# Patient Record
Sex: MALE | Race: OTHER
[De-identification: names, ages, dates, MRNs, and addresses within clinical notes are randomized per-mention and may not be internally consistent; named-entity substitution may affect disease eponyms.]

---

## 2017-06-30 NOTE — [facility] DISCHARGE SUMMARY E
[facility] Discharge Summary



NAME: CHOCO MONTESINOS

                                      MRN: [id number]

                                AGE: 52Y

ADMITTED: 06/30/2017           DISCHARGED:



FINAL DIAGNOSIS:

Cataract right eye.



HOSPITAL COURSE:

The patient is a 52-year-old gentleman, who underwent uneventful cataract

extraction with intraocular lens implant right eye on 06/30/2017.



DISPOSITION:

The patient was discharged to home.



DISCHARGE INSTRUCTIONS:

He is instructed to resume preoperative medications, take Tylenol as needed

for discomfort, use eye shield, to use Besivance, Durezol, and Ilevro at 3

p.m. and 8 p.m., and to follow up in my office in 1 day.



DICTATING PHYSICIAN: HUI DÍAZ M.D.



[id number]              DT: 06/30/2017 0825

PHY#: [id number]        DD: 06/30/2017 0808

ID:   [id number]               JOB#: [id number]       ACCT: [id number]



cc:HUI DÍAZ M.D.

>

## 2020-03-06 ENCOUNTER — HOSPITAL ENCOUNTER (OUTPATIENT)
Dept: HOSPITAL 62 - OD | Age: 55
End: 2020-03-06
Attending: INTERNAL MEDICINE
Payer: MEDICARE

## 2020-03-06 DIAGNOSIS — R11.10: Primary | ICD-10-CM

## 2020-03-06 PROCEDURE — 74018 RADEX ABDOMEN 1 VIEW: CPT

## 2020-03-06 NOTE — RADIOLOGY REPORT (SQ)
EXAM DESCRIPTION:  KUB



COMPLETED DATE/TIME:  3/6/2020 12:16 pm



REASON FOR STUDY:  VOMITING, UNSPECIFIED R11.10  VOMITING, UNSPECIFIED



COMPARISON:  None.



NUMBER OF VIEWS:  One view.



TECHNIQUE:   Supine radiographic image of the abdomen acquired.



LIMITATIONS:  None.



FINDINGS:  BOWEL GAS PATTERN: Normal bowel gas pattern. No dilated loops.

CALCIFICATIONS: No radiopaque stones overlie kidneys or expected course of ureters.  Scattered pelvic
 phleboliths.

SOFT TISSUES: No gross mass or suggestion of organomegaly.

HARDWARE: Peritoneal dialysis catheter coiled over midline pelvis.  Partially visualize cancellous sc
rews within the left femoral neck.

BONES: No acute fracture. No worrisome bone lesions.

OTHER: No other significant finding.



IMPRESSION:  No evidence of intestinal obstruction or other acute intra-abdominal/pelvic process.



TECHNICAL DOCUMENTATION:  JOB ID:  [id number]

 2011 [employer]- All Rights Reserved



Reading location - IP/workstation name: [email or web address]

## 2020-03-25 ENCOUNTER — HOSPITAL ENCOUNTER (INPATIENT)
Dept: HOSPITAL 62 - 4S | Age: 55
LOS: 3 days | Discharge: HOME | DRG: 391 | End: 2020-03-28
Attending: INTERNAL MEDICINE | Admitting: INTERNAL MEDICINE
Payer: MEDICARE

## 2020-03-25 DIAGNOSIS — Z82.49: ICD-10-CM

## 2020-03-25 DIAGNOSIS — J44.9: ICD-10-CM

## 2020-03-25 DIAGNOSIS — N18.6: ICD-10-CM

## 2020-03-25 DIAGNOSIS — Z83.3: ICD-10-CM

## 2020-03-25 DIAGNOSIS — Z87.891: ICD-10-CM

## 2020-03-25 DIAGNOSIS — I12.0: ICD-10-CM

## 2020-03-25 DIAGNOSIS — R11.2: Primary | ICD-10-CM

## 2020-03-25 DIAGNOSIS — E11.319: ICD-10-CM

## 2020-03-25 DIAGNOSIS — D63.1: ICD-10-CM

## 2020-03-25 DIAGNOSIS — E11.21: ICD-10-CM

## 2020-03-25 DIAGNOSIS — Z83.6: ICD-10-CM

## 2020-03-25 DIAGNOSIS — Z91.19: ICD-10-CM

## 2020-03-25 DIAGNOSIS — Z03.818: ICD-10-CM

## 2020-03-25 DIAGNOSIS — Z79.899: ICD-10-CM

## 2020-03-25 DIAGNOSIS — E83.39: ICD-10-CM

## 2020-03-25 DIAGNOSIS — Z99.2: ICD-10-CM

## 2020-03-25 DIAGNOSIS — N25.81: ICD-10-CM

## 2020-03-25 DIAGNOSIS — E87.5: ICD-10-CM

## 2020-03-25 DIAGNOSIS — F32.9: ICD-10-CM

## 2020-03-25 DIAGNOSIS — E11.40: ICD-10-CM

## 2020-03-25 DIAGNOSIS — E11.22: ICD-10-CM

## 2020-03-25 DIAGNOSIS — E83.51: ICD-10-CM

## 2020-03-25 DIAGNOSIS — E78.5: ICD-10-CM

## 2020-03-25 LAB
ADD MANUAL DIFF: NO
ALBUMIN SERPL-MCNC: 4 G/DL (ref 3.5–5)
ALBUMIN SERPL-MCNC: 4 G/DL (ref 3.5–5)
ALP SERPL-CCNC: 73 U/L (ref 38–126)
ALP SERPL-CCNC: 73 U/L (ref 38–126)
AMYLASE SERPL-CCNC: 144 U/L (ref 30–110)
ANION GAP SERPL CALC-SCNC: 14 MMOL/L (ref 5–19)
ANION GAP SERPL CALC-SCNC: 14 MMOL/L (ref 5–19)
APPEARANCE FLD: CLEAR
APPEARANCE UR: CLEAR
APTT BLD: 27.8 SEC (ref 23.5–35.8)
APTT PPP: YELLOW S
AST SERPL-CCNC: 25 U/L (ref 17–59)
AST SERPL-CCNC: 25 U/L (ref 17–59)
BARBITURATES UR QL SCN: NEGATIVE
BASOPHILS # BLD AUTO: 0 10^3/UL (ref 0–0.2)
BASOPHILS NFR BLD AUTO: 0.3 % (ref 0–2)
BILIRUB DIRECT SERPL-MCNC: 0.4 MG/DL (ref 0–0.4)
BILIRUB DIRECT SERPL-MCNC: 0.4 MG/DL (ref 0–0.4)
BILIRUB SERPL-MCNC: 0.4 MG/DL (ref 0.2–1.3)
BILIRUB SERPL-MCNC: 0.4 MG/DL (ref 0.2–1.3)
BILIRUB UR QL STRIP: NEGATIVE
BODY FLD TYPE: (no result)
BUN SERPL-MCNC: 57 MG/DL (ref 7–20)
BUN SERPL-MCNC: 57 MG/DL (ref 7–20)
CALCIUM: 7.9 MG/DL (ref 8.4–10.2)
CALCIUM: 7.9 MG/DL (ref 8.4–10.2)
CHLORIDE SERPL-SCNC: 99 MMOL/L (ref 98–107)
CHLORIDE SERPL-SCNC: 99 MMOL/L (ref 98–107)
CO2 SERPL-SCNC: 27 MMOL/L (ref 22–30)
CO2 SERPL-SCNC: 27 MMOL/L (ref 22–30)
EOSINOPHIL # BLD AUTO: 0 10^3/UL (ref 0–0.6)
EOSINOPHIL NFR BLD AUTO: 0.1 % (ref 0–6)
ERYTHROCYTE [DISTWIDTH] IN BLOOD BY AUTOMATED COUNT: 17.8 % (ref 11.5–14)
FLUID COLOR: COLORLESS
FLUID SOURCE: (no result)
FLUID VISCOSITY: (no result)
FREE T4 (FREE THYROXINE): 1 NG/DL (ref 0.78–2.19)
GLUCOSE SERPL-MCNC: 115 MG/DL (ref 75–110)
GLUCOSE SERPL-MCNC: 115 MG/DL (ref 75–110)
GLUCOSE UR STRIP-MCNC: >=500 MG/DL
HCT VFR BLD CALC: 35.3 % (ref 37.9–51)
HGB BLD-MCNC: 11.8 G/DL (ref 13.5–17)
INR PPP: 1.07
KETONES UR STRIP-MCNC: NEGATIVE MG/DL
LYMPHOCYTES # BLD AUTO: 0.4 10^3/UL (ref 0.5–4.7)
LYMPHOCYTES NFR BLD AUTO: 6.7 % (ref 13–45)
MCH RBC QN AUTO: 32.6 PG (ref 27–33.4)
MCHC RBC AUTO-ENTMCNC: 33.4 G/DL (ref 32–36)
MCV RBC AUTO: 98 FL (ref 80–97)
METHADONE UR QL SCN: NEGATIVE
MONOCYTES # BLD AUTO: 0.3 10^3/UL (ref 0.1–1.4)
MONOCYTES NFR BLD AUTO: 5.1 % (ref 3–13)
NEUTROPHILS # BLD AUTO: 5.5 10^3/UL (ref 1.7–8.2)
NEUTS SEG NFR BLD AUTO: 87.8 % (ref 42–78)
NITRITE UR QL STRIP: NEGATIVE
PCP UR QL SCN: NEGATIVE
PH UR STRIP: 7 [PH] (ref 5–9)
PHOSPHATE SERPL-MCNC: 6.5 MG/DL (ref 2.5–4.5)
PLATELET # BLD: 197 10^3/UL (ref 150–450)
POTASSIUM SERPL-SCNC: 3.8 MMOL/L (ref 3.6–5)
POTASSIUM SERPL-SCNC: 3.8 MMOL/L (ref 3.6–5)
PROT SERPL-MCNC: 7 G/DL (ref 6.3–8.2)
PROT SERPL-MCNC: 7 G/DL (ref 6.3–8.2)
PROT UR STRIP-MCNC: >=500 MG/DL
PROTHROMBIN TIME: 13.9 SEC (ref 11.4–15.4)
RBC # BLD AUTO: 3.61 10^6/UL (ref 4.35–5.55)
SP GR UR STRIP: 1.01
TOTAL CELLS COUNTED % (AUTO): 100 %
TSH SERPL-ACNC: 2.22 UIU/ML (ref 0.47–4.68)
URINE AMPHETAMINES SCREEN: NEGATIVE
URINE BENZODIAZEPINES SCREEN: NEGATIVE
URINE COCAINE SCREEN: NEGATIVE
URINE MARIJUANA (THC) SCREEN: NEGATIVE
UROBILINOGEN UR-MCNC: NEGATIVE MG/DL (ref ?–2)
WBC # BLD AUTO: 6.2 10^3/UL (ref 4–10.5)

## 2020-03-25 PROCEDURE — 87075 CULTR BACTERIA EXCEPT BLOOD: CPT

## 2020-03-25 PROCEDURE — 89050 BODY FLUID CELL COUNT: CPT

## 2020-03-25 PROCEDURE — 84439 ASSAY OF FREE THYROXINE: CPT

## 2020-03-25 PROCEDURE — 83880 ASSAY OF NATRIURETIC PEPTIDE: CPT

## 2020-03-25 PROCEDURE — 85027 COMPLETE CBC AUTOMATED: CPT

## 2020-03-25 PROCEDURE — 85610 PROTHROMBIN TIME: CPT

## 2020-03-25 PROCEDURE — 82962 GLUCOSE BLOOD TEST: CPT

## 2020-03-25 PROCEDURE — 80061 LIPID PANEL: CPT

## 2020-03-25 PROCEDURE — 71045 X-RAY EXAM CHEST 1 VIEW: CPT

## 2020-03-25 PROCEDURE — 80307 DRUG TEST PRSMV CHEM ANLYZR: CPT

## 2020-03-25 PROCEDURE — 85730 THROMBOPLASTIN TIME PARTIAL: CPT

## 2020-03-25 PROCEDURE — 87635 SARS-COV-2 COVID-19 AMP PRB: CPT

## 2020-03-25 PROCEDURE — 84100 ASSAY OF PHOSPHORUS: CPT

## 2020-03-25 PROCEDURE — 90945 DIALYSIS ONE EVALUATION: CPT

## 2020-03-25 PROCEDURE — 74176 CT ABD & PELVIS W/O CONTRAST: CPT

## 2020-03-25 PROCEDURE — 83735 ASSAY OF MAGNESIUM: CPT

## 2020-03-25 PROCEDURE — 87880 STREP A ASSAY W/OPTIC: CPT

## 2020-03-25 PROCEDURE — 82553 CREATINE MB FRACTION: CPT

## 2020-03-25 PROCEDURE — 80048 BASIC METABOLIC PNL TOTAL CA: CPT

## 2020-03-25 PROCEDURE — 80053 COMPREHEN METABOLIC PANEL: CPT

## 2020-03-25 PROCEDURE — 85025 COMPLETE CBC W/AUTO DIFF WBC: CPT

## 2020-03-25 PROCEDURE — C9113 INJ PANTOPRAZOLE SODIUM, VIA: HCPCS

## 2020-03-25 PROCEDURE — 87804 INFLUENZA ASSAY W/OPTIC: CPT

## 2020-03-25 PROCEDURE — 36415 COLL VENOUS BLD VENIPUNCTURE: CPT

## 2020-03-25 PROCEDURE — 87205 SMEAR GRAM STAIN: CPT

## 2020-03-25 PROCEDURE — 82150 ASSAY OF AMYLASE: CPT

## 2020-03-25 PROCEDURE — 84443 ASSAY THYROID STIM HORMONE: CPT

## 2020-03-25 PROCEDURE — 83690 ASSAY OF LIPASE: CPT

## 2020-03-25 PROCEDURE — 83036 HEMOGLOBIN GLYCOSYLATED A1C: CPT

## 2020-03-25 PROCEDURE — 87070 CULTURE OTHR SPECIMN AEROBIC: CPT

## 2020-03-25 PROCEDURE — 80076 HEPATIC FUNCTION PANEL: CPT

## 2020-03-25 PROCEDURE — 81001 URINALYSIS AUTO W/SCOPE: CPT

## 2020-03-25 PROCEDURE — 82140 ASSAY OF AMMONIA: CPT

## 2020-03-25 PROCEDURE — 87040 BLOOD CULTURE FOR BACTERIA: CPT

## 2020-03-25 PROCEDURE — 82550 ASSAY OF CK (CPK): CPT

## 2020-03-25 RX ADMIN — PANTOPRAZOLE SODIUM SCH MG: 40 INJECTION, POWDER, FOR SOLUTION INTRAVENOUS at 21:40

## 2020-03-25 RX ADMIN — HYDRALAZINE HYDROCHLORIDE SCH MG: 50 TABLET, FILM COATED ORAL at 21:41

## 2020-03-25 RX ADMIN — ATORVASTATIN CALCIUM SCH MG: 40 TABLET, FILM COATED ORAL at 21:41

## 2020-03-25 RX ADMIN — HEPARIN SODIUM SCH: 5000 INJECTION, SOLUTION INTRAVENOUS; SUBCUTANEOUS at 17:17

## 2020-03-25 RX ADMIN — INSULIN LISPRO SCH: 100 INJECTION, SOLUTION INTRAVENOUS; SUBCUTANEOUS at 21:47

## 2020-03-25 RX ADMIN — HEPARIN SODIUM SCH UNIT: 5000 INJECTION, SOLUTION INTRAVENOUS; SUBCUTANEOUS at 21:40

## 2020-03-25 RX ADMIN — DEXAMETHASONE SODIUM PHOSPHATE PRN MG: 10 INJECTION INTRAMUSCULAR; INTRAVENOUS at 20:15

## 2020-03-25 RX ADMIN — CEFTRIAXONE SCH MLS/HR: 1 INJECTION, SOLUTION INTRAVENOUS at 21:42

## 2020-03-25 RX ADMIN — FUROSEMIDE SCH MG: 80 TABLET ORAL at 21:41

## 2020-03-25 NOTE — RADIOLOGY REPORT (SQ)
EXAM DESCRIPTION:  CT ABD/PELVIS NO ORAL OR IV



COMPLETED DATE/TIME:  3/25/2020 4:20 pm



REASON FOR STUDY:  INTRACTABLE VOMITING



COMPARISON:  3/11/2019



TECHNIQUE:  CT scan of the abdomen and pelvis performed without intravenous or oral contrast. Images 
reviewed with lung, soft tissue, and bone windows. Reconstructed coronal and sagittal MPR images revi
ewed. All images stored on PACS.

All CT scanners at this facility use dose modulation, iterative reconstruction, and/or weight based d
osing when appropriate to reduce radiation dose to as low as reasonably achievable (ALARA).

[facility]: Dose Right  [facility]: CareDose    [facility]: Dose Right    [facility]: Teradose 4D    [facility]: Smart Technologies



RADIATION DOSE:  CT Rad equipment meets quality standard of care and radiation dose reduction techniq
ues were employed. CTDIvol: 7.9 mGy. DLP: 416 mGy-cm.mGy.



LIMITATIONS:  None.



FINDINGS:  LOWER CHEST: Hiatal hernia.

NON-CONTRASTED LIVER, SPLEEN, ADRENALS: Evaluation limited by lack of IV contrast. No identified sign
ificant masses.

PANCREAS: No masses. No peripancreatic inflammatory changes.

GALLBLADDER: No identified stones by CT criteria. No inflammatory changes to suggest cholecystitis.

RIGHT KIDNEY AND URETER: No suspicious masses. Assessment limited by lack of IV contrast.   No signif
icant calcifications.   No hydronephrosis or hydroureter.

LEFT KIDNEY AND URETER: No suspicious masses. Assessment limited by lack of IV contrast.   No signifi
cant calcifications.   No hydronephrosis or hydroureter.

AORTA AND RETROPERITONEUM: No aneurysm. No retroperitoneal masses or adenopathy.

BOWEL AND PERITONEAL CAVITY: Percutaneous catheter coiled in the pelvis from a left lower quadrant an
terior approach.

APPENDIX: Not visualized.

PELVIS, BLADDER, AND ABDOMINAL WALL:No abnormal masses. No free fluid. Bladder normal.

BONES: No significant findings.

OTHER: No other significant finding.



IMPRESSION:  No acute findings.



COMMENT:  Quality ID # [id number]: Final reports with documentation of one or more dose reduction techniques
 (e.g., Automated exposure control, adjustment of the mA and/or kV according to patient size, use of 
iterative reconstruction technique)



TECHNICAL DOCUMENTATION:  JOB ID:  [id number]

 2011 [employer]- All Rights Reserved



Reading location - IP/workstation name: [email or web address]

## 2020-03-25 NOTE — PDOC H&P
History of Present Illness


Admission Date/PCP: 


  03/25/20 14:12





  SARBJIT CASTRO MD





History of Present Illness: 


CHOCO MONTESIONS JR is a 55 year old male, He has end-stage renal disease on p

eritoneal dialysis, he came to the office today for evaluation of vomiting for 

the last 2 days, he said he is not able to keep any food down in his stomach he 

has been vomiting potentially in the last 2 days.  I was particularly concerned 

about peritonitis in this patient because he is on PD the abdomen is not 

particularly tender on palpation but that by itself is not sensitive enough to 

diagnose peritonitis in this patient population.  He was admitted directly from 

outpatient into the hospital a CAT scan was obtained it was nondiagnostic there 

was no acute pathology demonstrated on the CAT scan.








Past Medical History


Cardiac Medical History: Reports: Hyperlipidema, Hypertension


Pulmonary Medical History: Reports: Chronic Obstructive Pulmonary Disease (COPD)


Neurological Medical History: 


   Denies: Seizures


Endocrine Medical History: Reports: Diabetes Mellitus Type 2 - He has diabetic 

retinopathy and nephropathy.  There is proteinuria


Renal/ Medical History: Reports: End Stage Renal Disease


Psychiatric Medical History: Reports: Depression


Hematology: Reports: Anemia


   Denies: Sickle Cell Disease





Past Surgical History


Past Surgical History: Reports: Orthopedic Surgery, Other - Left leg for plate 

in his ankle, also has plates in his shoulder and hip.


   Denies: Pacemaker





Social History


Smoking Status: Former Smoker


Last Time Smoked: 1/1/2017


Frequency of Alcohol Use: None


Hx Recreational Drug Use: No


Drugs: None


Hx Prescription Drug Abuse: No





Family History


Family History: CAD, COPD, DM, Hyperlipidemia, Hypertension


Parental Family History Reviewed: Yes


Children Family History Reviewed: Yes


Sibling(s) Family History Reviewed.: Yes





Medication/Allergy


Home Medications: 








Atorvastatin Calcium [Lipitor 40 mg Tablet] 40 mg PO DAILY 03/25/20 


Furosemide [Lasix 80 mg Tablet] 80 mg PO Q12 03/25/20 


Gentamicin Sulfate [Garamycin 0.1% Cream 15 gm] 1 applic TOP ASDIR PRN 03/25/20 


Hydralazine HCl 100 mg PO Q8 03/25/20 








Allergies/Adverse Reactions: 


                                        





No Known Allergies Allergy (Verified 07/30/19 17:02)


   











Review of Systems


Constitutional: ABSENT: chills, fever(s), headache(s), weight gain, weight loss


Eyes: ABSENT: visual disturbances


Ears: ABSENT: hearing changes


Cardiovascular: ABSENT: chest pain, dyspnea on exertion, edema, orthropnea, 

palpitations


Respiratory: ABSENT: cough, hemoptysis


Gastrointestinal: PRESENT: abdominal pain, vomiting


Genitourinary: ABSENT: dysuria, hematuria


Musculoskeletal: ABSENT: joint swelling


Integumentary: ABSENT: rash, wounds


Neurological: ABSENT: abnormal gait, abnormal speech, confusion, dizziness, 

focal weakness, syncope


Psychiatric: ABSENT: anxiety, depression, homidical ideation, suicidal ideation


Endocrine: ABSENT: cold intolerance, heat intolerance, menstrual abnormalities, 

polydipsia, polyuria


Hematologic/Lymphatic: ABSENT: easy bleeding, easy bruising, lymphadenopathy





Physical Exam


Vital Signs: 


                                        











Temp Pulse Resp BP Pulse Ox


 


 98.0 F   96   17   198/76 H  98 


 


 03/25/20 15:42  03/25/20 15:42  03/25/20 15:42  03/25/20 15:45  03/25/20 15:42








                                 Intake & Output











 03/24/20 03/25/20 03/26/20





 06:59 06:59 06:59


 


Intake Total   240


 


Balance   240


 


Weight   76.6 kg











General appearance: PRESENT: no acute distress


Head exam: PRESENT: atraumatic, normocephalic


Eye exam: PRESENT: conjunctiva pink, EOMI, PERRLA


Ear exam: PRESENT: normal external ear exam


Mouth exam: PRESENT: moist, tongue midline


Neck exam: PRESENT: full ROM


Respiratory exam: PRESENT: clear to auscultation nacho


Cardiovascular exam: PRESENT: RRR, +S1, +S2


Pulses: PRESENT: normal dorsalis pedis pul, +2 pedal pulses bilateral


Vascular exam: PRESENT: normal capillary refill


GI/Abdominal exam: PRESENT: normal bowel sounds, soft


Rectal exam: PRESENT: deferred


Neurological exam: PRESENT: alert, awake, oriented to person, oriented to place,

oriented to time, oriented to situation, CN II-XII grossly intact.  ABSENT: 

motor sensory deficit


Psychiatric exam: PRESENT: appropriate affect, normal mood


Skin exam: PRESENT: dry, intact, warm





Results


Laboratory Results: 


                                        





                                 03/25/20 14:52 





                                 03/25/20 14:52 





                                        











  03/25/20 03/25/20 03/25/20





  14:52 14:52 14:52


 


WBC  6.2  


 


RBC  3.61 L  


 


Hgb  11.8 L  


 


Hct  35.3 L  


 


MCV  98 H  


 


MCH  32.6  


 


MCHC  33.4  


 


RDW  17.8 H  


 


Plt Count  197  


 


Seg Neutrophils %  87.8 H  


 


Sodium   140.0  140.0


 


Potassium   3.8  3.8


 


Chloride   99  99


 


Carbon Dioxide   27  27


 


Anion Gap   14  14


 


BUN   57 H  57 H


 


Creatinine   13.27 H  13.27 H


 


Est GFR ( Amer)   5 L  5 L


 


Glucose   115 H  115 H


 


Calcium   7.9 L  7.9 L


 


Phosphorus    6.5 H


 


Magnesium    1.5 L


 


Total Bilirubin   0.4  0.4


 


AST   25  25


 


Alkaline Phosphatase   73  73


 


Ammonia   


 


Total Protein   7.0  7.0


 


Albumin   4.0  4.0


 


Amylase    144 H


 


Lipase    262.4


 


TSH   


 


Free T4   


 


Urine Color   


 


Urine Appearance   


 


Urine pH   


 


Ur Specific Gravity   


 


Urine Protein   


 


Urine Glucose (UA)   


 


Urine Ketones   


 


Urine Blood   


 


Urine Nitrite   


 


Ur Leukocyte Esterase   


 


Urine WBC (Auto)   


 


Urine RBC (Auto)   














  03/25/20 03/25/20 03/25/20





  14:52 17:05 17:26


 


WBC   


 


RBC   


 


Hgb   


 


Hct   


 


MCV   


 


MCH   


 


MCHC   


 


RDW   


 


Plt Count   


 


Seg Neutrophils %   


 


Sodium   


 


Potassium   


 


Chloride   


 


Carbon Dioxide   


 


Anion Gap   


 


BUN   


 


Creatinine   


 


Est GFR (African Amer)   


 


Glucose   


 


Calcium   


 


Phosphorus   


 


Magnesium   


 


Total Bilirubin   


 


AST   


 


Alkaline Phosphatase   


 


Ammonia    < 8.7 L


 


Total Protein   


 


Albumin   


 


Amylase   


 


Lipase   


 


TSH  2.22  


 


Free T4  1.00  


 


Urine Color   YELLOW 


 


Urine Appearance   CLEAR 


 


Urine pH   7.0 


 


Ur Specific Gravity   1.012 


 


Urine Protein   >=500 H 


 


Urine Glucose (UA)   >=500 H 


 


Urine Ketones   NEGATIVE 


 


Urine Blood   NEGATIVE 


 


Urine Nitrite   NEGATIVE 


 


Ur Leukocyte Esterase   MODERATE H 


 


Urine WBC (Auto)   51 


 


Urine RBC (Auto)   3 








                                        











  03/25/20 03/25/20 03/25/20





  14:52 17:26 17:26


 


Creatine Kinase   241 H 


 


CK-MB (CK-2)    3.15


 


NT-Pro-B Natriuret Pep  34241 H  











Impressions: 


                                        





Abdomen/Pelvis CT  03/25/20 00:00


IMPRESSION:  No acute findings.


 














Assessment & Plan





- Diagnosis


(1) Intractable vomiting


Qualifiers: 


   Vomiting type: unspecified   Nausea presence: with nausea   Qualified 

Code(s): R11.2 - Nausea with vomiting, unspecified   


Is this a current diagnosis for this admission?: Yes   


Plan: 


The differential diagnosis very long, The CT scan  did not demonstrate any 

obstruction. patient will be empirically started on Rocephin after blood 

cultures obtained and also peritoneal fluid analysis, suspect peritonitis








(2) End-stage renal disease on peritoneal dialysis


Is this a current diagnosis for this admission?: Yes   


Plan: 


Consult nephrology for management

## 2020-03-26 LAB
CHOLEST SERPL-MCNC: 192.98 MG/DL (ref 0–200)
LDLC SERPL DIRECT ASSAY-MCNC: 102 MG/DL (ref ?–100)
TRIGL SERPL-MCNC: 165 MG/DL (ref ?–150)
VLDLC SERPL CALC-MCNC: 33 MG/DL (ref 10–31)

## 2020-03-26 RX ADMIN — FUROSEMIDE SCH MG: 80 TABLET ORAL at 10:05

## 2020-03-26 RX ADMIN — DEXAMETHASONE SODIUM PHOSPHATE PRN MG: 10 INJECTION INTRAMUSCULAR; INTRAVENOUS at 17:36

## 2020-03-26 RX ADMIN — HYDRALAZINE HYDROCHLORIDE SCH MG: 50 TABLET, FILM COATED ORAL at 21:06

## 2020-03-26 RX ADMIN — HYDROMORPHONE HYDROCHLORIDE PRN MG: 2 INJECTION INTRAMUSCULAR; INTRAVENOUS; SUBCUTANEOUS at 20:18

## 2020-03-26 RX ADMIN — FUROSEMIDE SCH MG: 80 TABLET ORAL at 21:06

## 2020-03-26 RX ADMIN — INSULIN LISPRO SCH: 100 INJECTION, SOLUTION INTRAVENOUS; SUBCUTANEOUS at 17:31

## 2020-03-26 RX ADMIN — DEXAMETHASONE SODIUM PHOSPHATE PRN MG: 10 INJECTION INTRAMUSCULAR; INTRAVENOUS at 05:40

## 2020-03-26 RX ADMIN — HEPARIN SODIUM SCH UNIT: 5000 INJECTION, SOLUTION INTRAVENOUS; SUBCUTANEOUS at 05:40

## 2020-03-26 RX ADMIN — INSULIN LISPRO SCH: 100 INJECTION, SOLUTION INTRAVENOUS; SUBCUTANEOUS at 21:07

## 2020-03-26 RX ADMIN — ATORVASTATIN CALCIUM SCH MG: 40 TABLET, FILM COATED ORAL at 21:06

## 2020-03-26 RX ADMIN — INSULIN LISPRO SCH: 100 INJECTION, SOLUTION INTRAVENOUS; SUBCUTANEOUS at 08:03

## 2020-03-26 RX ADMIN — PANTOPRAZOLE SODIUM SCH MG: 40 INJECTION, POWDER, FOR SOLUTION INTRAVENOUS at 10:05

## 2020-03-26 RX ADMIN — CEFTRIAXONE SCH MLS/HR: 1 INJECTION, SOLUTION INTRAVENOUS at 21:07

## 2020-03-26 RX ADMIN — HYDRALAZINE HYDROCHLORIDE SCH MG: 50 TABLET, FILM COATED ORAL at 14:40

## 2020-03-26 RX ADMIN — INSULIN LISPRO SCH UNIT: 100 INJECTION, SOLUTION INTRAVENOUS; SUBCUTANEOUS at 11:54

## 2020-03-26 RX ADMIN — ACETAMINOPHEN PRN MG: 325 TABLET ORAL at 03:03

## 2020-03-26 RX ADMIN — GENTAMICIN SULFATE SCH APPLIC: 1 CREAM TOPICAL at 10:04

## 2020-03-26 RX ADMIN — ACETAMINOPHEN PRN MG: 325 TABLET ORAL at 17:37

## 2020-03-26 RX ADMIN — HYDRALAZINE HYDROCHLORIDE SCH MG: 50 TABLET, FILM COATED ORAL at 05:39

## 2020-03-26 RX ADMIN — HEPARIN SODIUM SCH: 5000 INJECTION, SOLUTION INTRAVENOUS; SUBCUTANEOUS at 15:10

## 2020-03-26 RX ADMIN — HEPARIN SODIUM SCH: 5000 INJECTION, SOLUTION INTRAVENOUS; SUBCUTANEOUS at 21:07

## 2020-03-26 NOTE — PDOC CONSULTATION
Consultation


Consult Date: 03/25/20


Provider Consulted: YOKO LANDERS


Consult reason:: ESRD on PD with abdominal pains and nausea and vomiting.





History of Present Illness


Admission Date/PCP: 


  03/25/20 14:12





  SARBJIT CASTRO MD





History of Present Illness: 


CHOCO MONTESINOS JR is a 55 year old male with a history of ESRD on peritoneal 

dialysis in the background of diabetes mellitus, hypertension comes in with a 

history of persistent and intractable nausea with vomiting of 1 day's 

duration.He says he has been sipping some amount of fluids but unable to eat 

anything.  He still makes small amounts of urine. There is no history of 

abdominal pains.  He says the effluent when he saw this morning was clear and 

not cloudy. He also gives a history of cough rather drive on the day before 

which apparently seems to have almost resolved as of today.  No history of any 

fever chills shortness of breath.  He continues to get PD at home on a cycler 

and goes dry in the day and starts his daily PD at 6 PM with a free back before 

he gets on the cycler.  His exit site is clean.  Labs and medications were 

reviewed.  Currently he is dry with no fluid in him.














Past Medical History


Cardiac Medical History: Reports: Hyperlipidemia, Hypertension-primary


   Denies: Coronary Artery Disease, Myocardial Infarction


Pulmonary Medical History: Reports: Chronic Obstructive Pulmonary Disease (COPD)


   Denies: Asthma, Bronchitis, Pneumonia


Neurological Medical History: 


   Denies: Seizures


Endocrine Medical History: Reports: Diabetes Mellitus Type 2 - He has diabetic 

retinopathy and nephropathy.  There is proteinuria


Complications of Diabetes: Reports: Autonomic Neuropathy, Nephropathy, 

Retinopathy


Renal/ Medical History: Reports: End Stage Renal Disease, Hypocalcemia, 

Hyperkalemia, Hyperphosphatemia, Secondary Hyperparathyroidism


GI Medical History: 


   Denies: Hepatitis, Hiatal Hernia


Musculoskeltal Medical History: 


   Denies: Arthritis


Psychiatric Medical History: Reports: Depression





Past Surgical History


Past Surgical History: Reports: Orthopedic Surgery, Other - Left leg for plate 

in his ankle, also has plates in his shoulder and hip.


   Denies: Pacemaker





Social History


Smoking Status: Former Smoker


Last Time Smoked: 1/1/2017


Frequency of Alcohol Use: None


Hx Recreational Drug Use: No


Drugs: None


Hx Prescription Drug Abuse: No





Family History


Parental Family History Reviewed: No


Children Family History Reviewed: No


Sibling(s) Family History Reviewed.: No





Medication/Allergy


Home Medications: 








Atorvastatin Calcium [Lipitor 40 mg Tablet] 40 mg PO DAILY 03/25/20 


Furosemide [Lasix 80 mg Tablet] 80 mg PO Q12 03/25/20 


Gentamicin Sulfate [Garamycin 0.1% Cream 15 gm] 1 applic TOP ASDIR PRN 03/25/20 


Hydralazine HCl 100 mg PO Q8 03/25/20 








Allergies/Adverse Reactions: 


                                        





No Known Allergies Allergy (Verified 07/30/19 17:02)


   











Review of Systems


Constitutional: PRESENT: anorexia.  ABSENT: chills, fatigue, fever(s), 

headache(s), night sweats, weakness


Nose, Mouth, and Throat: ABSENT: mouth pain, sore throat


Cardiovascular: ABSENT: chest pain, dyspnea on exertion, edema, orthropnea, 

palpitations


Respiratory: PRESENT: cough - Had a dry cough yesterday but seems to be almost 

resolved as of today..  ABSENT: dyspnea, hemoptysis


Gastrointestinal: PRESENT: nausea, vomiting.  ABSENT: abdominal pain, bloating, 

coffee ground emesis, constipation, diarrhea, dysphagia, heartburn, hematemesis,

hematochezia


Genitourinary: ABSENT: dysuria, hematuria


Musculoskeletal: ABSENT: deformity, joint swelling


Integumentary: ABSENT: erythema, lesions, pruritus, rash


Neurological: ABSENT: abnormal movements, abnormal speech, confusion, 

convulsions, focal weakness, frequent falls


Psychiatric: ABSENT: anxiety


Hematologic/Lymphatic: ABSENT: easy bruising, lymphadenopathy





Physical Exam


Vital Signs: 


                                        











Temp Pulse Resp BP Pulse Ox


 


 98.0 F   96   17   198/76 H  98 


 


 03/25/20 15:42  03/25/20 15:42  03/25/20 15:42  03/25/20 15:45  03/25/20 15:42








                                 Intake & Output











 03/24/20 03/25/20 03/26/20





 06:59 06:59 06:59


 


Intake Total   240


 


Balance   240


 


Weight   76.6 kg











General appearance: PRESENT: no acute distress


Eye exam: PRESENT: EOMI, PERRLA.  ABSENT: scleral icterus


Ear exam: PRESENT: normal external ear exam


Mouth exam: ABSENT: moist


Neck exam: ABSENT: lymphadenopathy, meningismus, tenderness, thyromegaly, 

tracheal deviation


Respiratory exam: PRESENT: clear to auscultation nacho.  ABSENT: crackles


Cardiovascular exam: PRESENT: +S1, +S2


GI/Abdominal exam: PRESENT: normal bowel sounds, soft.  ABSENT: organomegaly, 

tenderness


Extremities exam: ABSENT: pedal edema


Neurological exam: PRESENT: alert, awake, oriented to person, oriented to place,

oriented to time


Psychiatric exam: PRESENT: appropriate affect


Skin exam: ABSENT: cyanosis, jaundice, mottled, rash





Results


Laboratory Results: 


                                        





                                 03/25/20 14:52 





                                 03/25/20 14:52 





                                        











  03/25/20 03/25/20 03/25/20





  14:52 14:52 14:52


 


WBC  6.2  


 


RBC  3.61 L  


 


Hgb  11.8 L  


 


Hct  35.3 L  


 


MCV  98 H  


 


MCH  32.6  


 


MCHC  33.4  


 


RDW  17.8 H  


 


Plt Count  197  


 


Seg Neutrophils %  87.8 H  


 


Sodium   140.0  140.0


 


Potassium   3.8  3.8


 


Chloride   99  99


 


Carbon Dioxide   27  27


 


Anion Gap   14  14


 


BUN   57 H  57 H


 


Creatinine   13.27 H  13.27 H


 


Est GFR ( Amer)   5 L  5 L


 


Glucose   115 H  115 H


 


Calcium   7.9 L  7.9 L


 


Phosphorus    6.5 H


 


Magnesium    1.5 L


 


Total Bilirubin   0.4  0.4


 


AST   25  25


 


Alkaline Phosphatase   73  73


 


Ammonia   


 


Total Protein   7.0  7.0


 


Albumin   4.0  4.0


 


Amylase    144 H


 


Lipase    262.4


 


TSH   


 


Free T4   


 


Urine Color   


 


Urine Appearance   


 


Urine pH   


 


Ur Specific Gravity   


 


Urine Protein   


 


Urine Glucose (UA)   


 


Urine Ketones   


 


Urine Blood   


 


Urine Nitrite   


 


Ur Leukocyte Esterase   


 


Urine WBC (Auto)   


 


Urine RBC (Auto)   














  03/25/20 03/25/20 03/25/20





  14:52 17:05 17:26


 


WBC   


 


RBC   


 


Hgb   


 


Hct   


 


MCV   


 


MCH   


 


MCHC   


 


RDW   


 


Plt Count   


 


Seg Neutrophils %   


 


Sodium   


 


Potassium   


 


Chloride   


 


Carbon Dioxide   


 


Anion Gap   


 


BUN   


 


Creatinine   


 


Est GFR (African Amer)   


 


Glucose   


 


Calcium   


 


Phosphorus   


 


Magnesium   


 


Total Bilirubin   


 


AST   


 


Alkaline Phosphatase   


 


Ammonia    < 8.7 L


 


Total Protein   


 


Albumin   


 


Amylase   


 


Lipase   


 


TSH  2.22  


 


Free T4  1.00  


 


Urine Color   YELLOW 


 


Urine Appearance   CLEAR 


 


Urine pH   7.0 


 


Ur Specific Gravity   1.012 


 


Urine Protein   >=500 H 


 


Urine Glucose (UA)   >=500 H 


 


Urine Ketones   NEGATIVE 


 


Urine Blood   NEGATIVE 


 


Urine Nitrite   NEGATIVE 


 


Ur Leukocyte Esterase   MODERATE H 


 


Urine WBC (Auto)   51 


 


Urine RBC (Auto)   3 








                                        











  03/25/20 03/25/20 03/25/20





  14:52 17:26 17:26


 


Creatine Kinase   241 H 


 


CK-MB (CK-2)    3.15


 


NT-Pro-B Natriuret Pep  25226 H  











Impressions: 


                                        





Abdomen/Pelvis CT  03/25/20 00:00


IMPRESSION:  No acute findings.


 














Assessment & Plan





- Diagnosis


(1) Intractable vomiting


Qualifiers: 


   Vomiting type: unspecified   Nausea presence: with nausea   Qualified 

Code(s): R11.2 - Nausea with vomiting, unspecified   


Is this a current diagnosis for this admission?: Yes   


Plan: 


Of 1 days duration.  No complaints of any GI bleeds.  No history of diarrhea.  

No abdominal pains.  CT scan negative for any obstruction.  Examination of the 

abdomen was rather benign.  Currently symptomatic treatment but is being 

evaluated further.








(2) End-stage renal disease on peritoneal dialysis


Is this a current diagnosis for this admission?: Yes   


Plan: 


Will want to rule out peritonitis.  Will instill and drain fluid and sent for 

appropriate studies.  Clinical evaluation is highly unlikely for peritonitis. 

Meanwhile discussed with treating nurse Inna to start CAPD with 2 L 

installation of 1.5% alternating with 2.5% fluids.  Discussed with patient as 

well and he is okay with this CAPD as we do not have cycler here in the 

hospital.








(3) Diabetes mellitus


Qualifiers: 


   Diabetes mellitus type: type 2   Diabetes mellitus complication status: with 

other specified complication 


Plan: 


As per Dr. Vinson








(4) Hypertension


Plan: 


Presently uncontrolled.  See response to dialysis and fluid removal.  Monitor.

## 2020-03-26 NOTE — PDOC PROGRESS REPORT
Subjective


Progress Note for:: 03/26/20


Subjective:: 





I saw patient today by the bedside the peritoneal fluid analysis was normal 

there is no white blood cell, the vomiting seems to have stopped last night I 

started him on IV Protonix but this evening he has developed pain I will consult

surgeon for possible EGD, he may have mucosal lesion of the upper GI tract


Reason For Visit: 


INTRACTABLE VOMITING, ? PERITONITIS, ESRD ON PD,








Physical Exam


Vital Signs: 


                                        











Temp Pulse Resp BP Pulse Ox


 


 98.1 F   87   18   156/75 H  100 


 


 03/26/20 14:58  03/26/20 14:58  03/26/20 14:58  03/26/20 14:58  03/26/20 14:58








                                 Intake & Output











 03/25/20 03/26/20 03/27/20





 06:59 06:59 06:59


 


Intake Total  7114 3780


 


Output Total  3200 4000


 


Balance  3914 -220


 


Weight  78.2 kg 











General appearance: PRESENT: no acute distress


Eye exam: PRESENT: PERRLA


Respiratory exam: PRESENT: clear to auscultation nacho


Cardiovascular exam: PRESENT: +S1, +S2


GI/Abdominal exam: PRESENT: soft


Neurological exam: PRESENT: alert





Results


Laboratory Results: 


                                        





                                 03/25/20 14:52 





                                 03/25/20 14:52 





                                        











  03/25/20 03/26/20





  19:45 03:22


 


Triglycerides   165 H


 


Cholesterol   192.98


 


LDL Cholesterol Direct   102 H


 


VLDL Cholesterol   33.0 H


 


HDL Cholesterol   47


 


Fluid Type  PERITONEAL 


 


Fluid Source  ABDOMEN 


 


Fluid Color  COLORLESS 


 


Fluid Appearance  CLEAR 


 


Fluid Viscosity  LIQUID 


 


Fluid WBC  2 


 


Fluid RBC  1 








                                        











  03/25/20 03/25/20 03/25/20





  14:52 17:26 17:26


 


Creatine Kinase   241 H 


 


CK-MB (CK-2)    3.15


 


NT-Pro-B Natriuret Pep  15883 H  














  03/25/20 03/25/20 03/26/20





  21:12 21:12 03:22


 


Creatine Kinase  217 H   226 H


 


CK-MB (CK-2)   2.80 


 


NT-Pro-B Natriuret Pep   














  03/26/20





  03:22


 


Creatine Kinase 


 


CK-MB (CK-2)  2.79


 


NT-Pro-B Natriuret Pep 











Impressions: 


                                        





Abdomen/Pelvis CT  03/25/20 00:00


IMPRESSION:  No acute findings.


 








Chest X-Ray  03/26/20 00:00


IMPRESSION:  NO ACUTE RADIOGRAPHIC FINDING IN THE CHEST.


 














Assessment & Plan





- Diagnosis


(1) Intractable vomiting


Qualifiers: 


   Vomiting type: unspecified   Nausea presence: with nausea   Qualified 

Code(s): R11.2 - Nausea with vomiting, unspecified   


Is this a current diagnosis for this admission?: Yes   


Plan: 


This seems to have stopped now he has pain, consult surgery for EGD








(2) End-stage renal disease on peritoneal dialysis


Is this a current diagnosis for this admission?: Yes   





- Time


Time Spent with patient: 25-34 minutes


Level of Care: MEDICAL


Medications reviewed and adjusted accordingly: Yes

## 2020-03-26 NOTE — RADIOLOGY REPORT (SQ)
EXAM DESCRIPTION:  CHEST SINGLE VIEW



COMPLETED DATE/TIME:  3/26/2020 8:17 am



REASON FOR STUDY:  sob



COMPARISON:  4/8/2019



EXAM PARAMETERS:  NUMBER OF VIEWS: One view.

TECHNIQUE: Single frontal radiographic view of the chest acquired.

RADIATION DOSE: NA

LIMITATIONS: None.



FINDINGS:  LUNGS AND PLEURA: No opacities, masses or pneumothorax. No pleural effusion.

MEDIASTINUM AND HILAR STRUCTURES: No masses.  Contour normal.

HEART AND VASCULAR STRUCTURES: Heart normal in size.  Normal vasculature.

BONES: No acute findings.

HARDWARE: None in the chest.

OTHER: No other significant finding.



IMPRESSION:  NO ACUTE RADIOGRAPHIC FINDING IN THE CHEST.



TECHNICAL DOCUMENTATION:  JOB ID:  [id number]

 2011 [employer]- All Rights Reserved



Reading location - IP/workstation name: [email or web address]

## 2020-03-27 LAB
A TYPE INFLUENZA AG: NEGATIVE
ALBUMIN SERPL-MCNC: 4.3 G/DL (ref 3.5–5)
ALP SERPL-CCNC: 82 U/L (ref 38–126)
ANION GAP SERPL CALC-SCNC: 15 MMOL/L (ref 5–19)
AST SERPL-CCNC: 26 U/L (ref 17–59)
B INFLUENZA AG: NEGATIVE
BILIRUB DIRECT SERPL-MCNC: 0.5 MG/DL (ref 0–0.4)
BILIRUB SERPL-MCNC: 0.5 MG/DL (ref 0.2–1.3)
BUN SERPL-MCNC: 58 MG/DL (ref 7–20)
CALCIUM: 8.3 MG/DL (ref 8.4–10.2)
CHLORIDE SERPL-SCNC: 95 MMOL/L (ref 98–107)
CO2 SERPL-SCNC: 28 MMOL/L (ref 22–30)
GLUCOSE SERPL-MCNC: 148 MG/DL (ref 75–110)
POTASSIUM SERPL-SCNC: 3.9 MMOL/L (ref 3.6–5)
PROT SERPL-MCNC: 7.5 G/DL (ref 6.3–8.2)

## 2020-03-27 RX ADMIN — ATORVASTATIN CALCIUM SCH MG: 40 TABLET, FILM COATED ORAL at 21:34

## 2020-03-27 RX ADMIN — HYDROMORPHONE HYDROCHLORIDE PRN MG: 2 INJECTION INTRAMUSCULAR; INTRAVENOUS; SUBCUTANEOUS at 09:41

## 2020-03-27 RX ADMIN — HYDRALAZINE HYDROCHLORIDE SCH MG: 50 TABLET, FILM COATED ORAL at 15:44

## 2020-03-27 RX ADMIN — PANTOPRAZOLE SODIUM SCH MG: 40 INJECTION, POWDER, FOR SOLUTION INTRAVENOUS at 21:34

## 2020-03-27 RX ADMIN — GENTAMICIN SULFATE SCH APPLIC: 1 CREAM TOPICAL at 09:29

## 2020-03-27 RX ADMIN — FUROSEMIDE SCH MG: 80 TABLET ORAL at 09:29

## 2020-03-27 RX ADMIN — HEPARIN SODIUM SCH: 5000 INJECTION, SOLUTION INTRAVENOUS; SUBCUTANEOUS at 15:45

## 2020-03-27 RX ADMIN — HYDROMORPHONE HYDROCHLORIDE PRN MG: 2 INJECTION INTRAMUSCULAR; INTRAVENOUS; SUBCUTANEOUS at 03:18

## 2020-03-27 RX ADMIN — HYDRALAZINE HYDROCHLORIDE SCH MG: 50 TABLET, FILM COATED ORAL at 05:51

## 2020-03-27 RX ADMIN — CEFTRIAXONE SCH MLS/HR: 1 INJECTION, SOLUTION INTRAVENOUS at 21:33

## 2020-03-27 RX ADMIN — INSULIN LISPRO SCH: 100 INJECTION, SOLUTION INTRAVENOUS; SUBCUTANEOUS at 15:10

## 2020-03-27 RX ADMIN — INSULIN LISPRO SCH: 100 INJECTION, SOLUTION INTRAVENOUS; SUBCUTANEOUS at 09:27

## 2020-03-27 RX ADMIN — DEXAMETHASONE SODIUM PHOSPHATE PRN MG: 10 INJECTION INTRAMUSCULAR; INTRAVENOUS at 08:27

## 2020-03-27 RX ADMIN — HYDROMORPHONE HYDROCHLORIDE PRN MG: 2 INJECTION INTRAMUSCULAR; INTRAVENOUS; SUBCUTANEOUS at 22:09

## 2020-03-27 RX ADMIN — PANTOPRAZOLE SODIUM SCH MG: 40 INJECTION, POWDER, FOR SOLUTION INTRAVENOUS at 09:29

## 2020-03-27 RX ADMIN — ACETAMINOPHEN PRN MG: 325 TABLET ORAL at 21:34

## 2020-03-27 RX ADMIN — FUROSEMIDE SCH MG: 80 TABLET ORAL at 21:34

## 2020-03-27 RX ADMIN — HEPARIN SODIUM SCH: 5000 INJECTION, SOLUTION INTRAVENOUS; SUBCUTANEOUS at 05:51

## 2020-03-27 RX ADMIN — INSULIN LISPRO SCH: 100 INJECTION, SOLUTION INTRAVENOUS; SUBCUTANEOUS at 17:22

## 2020-03-27 RX ADMIN — HEPARIN SODIUM SCH: 5000 INJECTION, SOLUTION INTRAVENOUS; SUBCUTANEOUS at 21:35

## 2020-03-27 RX ADMIN — HYDRALAZINE HYDROCHLORIDE SCH MG: 50 TABLET, FILM COATED ORAL at 21:34

## 2020-03-27 RX ADMIN — INSULIN LISPRO SCH: 100 INJECTION, SOLUTION INTRAVENOUS; SUBCUTANEOUS at 22:33

## 2020-03-27 RX ADMIN — DEXAMETHASONE SODIUM PHOSPHATE PRN MG: 10 INJECTION INTRAMUSCULAR; INTRAVENOUS at 01:41

## 2020-03-27 RX ADMIN — ACETAMINOPHEN PRN MG: 325 TABLET ORAL at 01:41

## 2020-03-27 NOTE — PDOC CONSULTATION
Consultation


Consult Date: 03/27/20


Provider Consulted: DAYAMI MCKEON


Consult reason:: Abdominal pain and nausea vomiting





History of Present Illness


Admission Date/PCP: 


  03/25/20 14:12





  SARBJIT CASTRO MD





History of Present Illness: 


CHOCO MONTESINOS JR is a 55 year old male with end-stage renal disease 

peritoneal dialysis dependent who presents with several day history of 

generalized malaise fever cough as well as some mid abdominal discomfort with 

anorexia and intermittent nausea and vomiting.  No diarrhea.  No travel history.

 Patient had a CT scan which demonstrated no significant bowel abnormalities.  

No significant findings other than his peritoneal dialysis catheter.








Past Medical History


Cardiac Medical History: Reports: Hyperlipidema, Hypertension


   Denies: Coronary Artery Disease, Myocardial Infarction


Pulmonary Medical History: Reports: Chronic Obstructive Pulmonary Disease (COPD)


   Denies: Asthma, Bronchitis, Pneumonia


Neurological Medical History: 


   Denies: Seizures


Endocrine Medical History: Reports: Diabetes Mellitus Type 2 - He has diabetic 

retinopathy and nephropathy.  There is proteinuria


Renal/ Medical History: Reports: End Stage Renal Disease


GI Medical History: 


   Denies: Hepatitis, Hiatal Hernia


Musculoskeltal Medical History: 


   Denies: Arthritis


Psychiatric Medical History: Reports: Depression


Hematology: Reports: Anemia


   Denies: Sickle Cell Disease





Past Surgical History


Past Surgical History: Reports: Orthopedic Surgery, Other - Left leg for plate 

in his ankle, also has plates in his shoulder and hip.


   Denies: Pacemaker





Social History


Smoking Status: Former Smoker


Last Time Smoked: 1/1/2017


Frequency of Alcohol Use: None


Hx Recreational Drug Use: No


Drugs: None


Hx Prescription Drug Abuse: No





Family History


Family History: CAD, COPD, DM, Hyperlipidemia, Hypertension


Parental Family History Reviewed: No


Children Family History Reviewed: No


Sibling(s) Family History Reviewed.: No





Medication/Allergy


Home Medications: 








Atorvastatin Calcium [Lipitor 40 mg Tablet] 40 mg PO DAILY 03/25/20 


Furosemide [Lasix 80 mg Tablet] 80 mg PO Q12 03/25/20 


Gentamicin Sulfate [Garamycin 0.1% Cream 15 gm] 1 applic TOP ASDIR PRN 03/25/20 


Hydralazine HCl 100 mg PO Q8 03/25/20 








Allergies/Adverse Reactions: 


                                        





No Known Allergies Allergy (Verified 07/30/19 17:02)


   











Physical Exam


Vital Signs: 


                                        











Temp Pulse Resp BP Pulse Ox


 


 97.9 F   79   16   185/65 H  100 


 


 03/27/20 11:57  03/27/20 11:57  03/27/20 11:57  03/27/20 11:57  03/27/20 11:57








                                 Intake & Output











 03/26/20 03/27/20 03/28/20





 06:59 06:59 06:59


 


Intake Total 7114 7210 1500


 


Output Total 3200 7500 1350


 


Balance 3914 -290 150


 


Weight 78.2 kg 76.3 kg 76.9 kg











General appearance: PRESENT: no acute distress, cooperative


GI/Abdominal exam: PRESENT: other - Soft, nondistended, nontender to palpation. 

No palpable hepatosplenomegaly.  Peritoneal dialysis catheter in place.  No 

surrounding erythema.





Results


Laboratory Results: 


                                        





                                 03/25/20 14:52 





                                 03/27/20 12:45 





                                        











  03/27/20





  12:45


 


Sodium  137.6


 


Potassium  3.9


 


Chloride  95 L


 


Carbon Dioxide  28


 


Anion Gap  15


 


BUN  58 H


 


Creatinine  12.68 H


 


Est GFR (African Amer)  5 L


 


Glucose  148 H


 


Calcium  8.3 L


 


Total Bilirubin  0.5


 


AST  26


 


Alkaline Phosphatase  82


 


Total Protein  7.5


 


Albumin  4.3


 


Lipase  244.7








                                        











  03/25/20 03/25/20 03/25/20





  14:52 17:26 17:26


 


Creatine Kinase   241 H 


 


CK-MB (CK-2)    3.15


 


NT-Pro-B Natriuret Pep  16713 H  














  03/25/20 03/25/20 03/26/20





  21:12 21:12 03:22


 


Creatine Kinase  217 H   226 H


 


CK-MB (CK-2)   2.80 


 


NT-Pro-B Natriuret Pep   














  03/26/20





  03:22


 


Creatine Kinase 


 


CK-MB (CK-2)  2.79


 


NT-Pro-B Natriuret Pep 











Impressions: 


                                        





Abdomen/Pelvis CT  03/25/20 00:00


IMPRESSION:  No acute findings.


 








Chest X-Ray  03/26/20 00:00


IMPRESSION:  NO ACUTE RADIOGRAPHIC FINDING IN THE CHEST.


 














Assessment & Plan





- Diagnosis


(1) Viral syndrome


Is this a current diagnosis for this admission?: Yes   


Plan: 


Patient's presenting symptoms is highly suspicious for viral syndrome with his 

fever generalized malaise, coughing as well as abdominal discomfort and 

anorexia.  I have reviewed the CT scan with radiology.  I do not see any 

significant findings.  His abdominal exam demonstrates no tenderness and 

certainly no peritoneal signs.  It is possible that he does have some component 

of gastritis or gastroesophageal reflux disease and he may benefit from an upper

endoscopy however I believe his presenting symptoms are more consistent with a 

viral syndrome.  I have discussed this matter with his primary care doctor and 

have recommended initial isolation until Covid 19 testing can be done.  Patient 

has been eating all day today although he has been having some emesis. With his 

p.o. intake I do not think it is safe to proceed with an EGD today.  If by 

Monday if primary care doctor still feels that EGD is indicated, please make 

patient n.p.o. and reconsult.  I do not think he has a surgical abdomen by his 

exam and his CT scan and his presenting symptoms.  Will place patient on proton 

pump inhibitor in the meantime.  Defer his viral syndrome work-up to his primary

care physician.

## 2020-03-27 NOTE — PDOC PROGRESS REPORT
Subjective


Progress Note for:: 03/27/20


Reason For Visit: 


Patient seen today.  He still continues to have intermittent dry heaves though 

no isadora vomiting.  No complaints of any abdominal pains.  His breathing is 

good.  Continues to get CAPD x4 exchanges in 24 hours.  Discussed his issues 

with his treating nurse Mónica.  He is not so compliant with a CAPD and tries 

to put some obstacles at times as per her.  Labs and medications were reviewed. 

Intermittent spikes in blood pressures.  Currently on alternating regimens of 

1.5/2.5% dianeal bags.





Physical Exam


Vital Signs: 


                                        











Temp Pulse Resp BP Pulse Ox


 


 98.2 F   78   16   184/73 H  97 


 


 03/27/20 05:11  03/27/20 07:00  03/27/20 05:11  03/27/20 05:11  03/27/20 05:11








                                 Intake & Output











 03/26/20 03/27/20 03/28/20





 06:59 06:59 06:59


 


Intake Total 7114 7210 


 


Output Total 3200 7500 


 


Balance 3914 -290 


 


Weight 78.2 kg 76.3 kg 











General appearance: PRESENT: no acute distress


Respiratory exam: PRESENT: clear to auscultation nacho.  ABSENT: crackles


Cardiovascular exam: PRESENT: +S1, +S2


GI/Abdominal exam: PRESENT: normal bowel sounds, soft.  ABSENT: organomegaly, 

tenderness


Extremities exam: ABSENT: pedal edema


Neurological exam: PRESENT: alert, awake, oriented to person





Results


Laboratory Results: 


                                        





                                 03/25/20 14:52 





                                 03/25/20 14:52 





                                        











  03/25/20 03/25/20 03/25/20





  14:52 17:26 17:26


 


Creatine Kinase   241 H 


 


CK-MB (CK-2)    3.15


 


NT-Pro-B Natriuret Pep  07861 H  














  03/25/20 03/25/20 03/26/20





  21:12 21:12 03:22


 


Creatine Kinase  217 H   226 H


 


CK-MB (CK-2)   2.80 


 


NT-Pro-B Natriuret Pep   














  03/26/20





  03:22


 


Creatine Kinase 


 


CK-MB (CK-2)  2.79


 


NT-Pro-B Natriuret Pep 











Impressions: 


                                        





Abdomen/Pelvis CT  03/25/20 00:00


IMPRESSION:  No acute findings.


 








Chest X-Ray  03/26/20 00:00


IMPRESSION:  NO ACUTE RADIOGRAPHIC FINDING IN THE CHEST.


 














Assessment & Plan





- Diagnosis


(1) Intractable vomiting


Qualifiers: 


   Vomiting type: unspecified   Nausea presence: with nausea   Qualified 

Code(s): R11.2 - Nausea with vomiting, unspecified   


Is this a current diagnosis for this admission?: Yes   


Plan: 


Though better still having intermittent episodes of nausea with dry heaves.  Has

as per Dr. Vinson.








(2) End-stage renal disease on peritoneal dialysis


Is this a current diagnosis for this admission?: Yes   


Plan: 


Continue on CAPD 4 exchanges in 24 hours.  Discussed the treatment schedule with

his treating nurse Mónica.  If his blood pressure is over 180 systolic I would 

use 4.25% bag x1 and if he is blood pressure drops below 120 I would use a 1.5% 

exchange.








(3) Diabetes mellitus


Qualifiers: 


   Diabetes mellitus type: type 2   Diabetes mellitus complication status: with 

other specified complication 


Plan: 


As per Dr. Vinson








(4) Hypertension


Plan: 


Generally relatively well controlled but for intermittent spikes which may be 

related to his nausea and dry heaves.  Monitor.

## 2020-03-27 NOTE — PDOC PROGRESS REPORT
Subjective


Progress Note for:: 03/27/20


Subjective:: 





Patient seen by the bedside, he was seen by the surgeon because I requested for 

consultation for possible EGD, the surgeon  raised the possibility of COVID 19, 

I have a very low suspicion for COVID 19 in this patient because his 

presentation does not meet the CDC guideline for COVID 19 suspicion which 

includes fever, cough, shortness of breath but it is at least reasonable to 

isolate him since the surgeon is concerned about that ,though unlikely. The 

initial flu test was negative, patient will be transferred to the fifth floor 

for isolation


Reason For Visit: 


INTRACTABLE VOMITING, ? PERITONITIS, ESRD ON PD,








Physical Exam


Vital Signs: 


                                        











Temp Pulse Resp BP Pulse Ox


 


 97.6 F   88   16   196/80 H  93 


 


 03/27/20 15:45  03/27/20 16:25  03/27/20 15:45  03/27/20 16:25  03/27/20 15:45








                                 Intake & Output











 03/26/20 03/27/20 03/28/20





 06:59 06:59 06:59


 


Intake Total 7114 7210 3240


 


Output Total 3200 7500 2950


 


Balance 3914 -290 290


 


Weight 78.2 kg 76.3 kg 76.6 kg











General appearance: PRESENT: no acute distress


Eye exam: PRESENT: PERRLA


Respiratory exam: PRESENT: clear to auscultation nacho


Cardiovascular exam: PRESENT: +S1, +S2


GI/Abdominal exam: PRESENT: soft


Neurological exam: PRESENT: alert, CN II-XII grossly intact





Results


Laboratory Results: 


                                        





                                 03/25/20 14:52 





                                 03/27/20 12:45 





                                        











  03/27/20





  12:45


 


Sodium  137.6


 


Potassium  3.9


 


Chloride  95 L


 


Carbon Dioxide  28


 


Anion Gap  15


 


BUN  58 H


 


Creatinine  12.68 H


 


Est GFR (African Amer)  5 L


 


Glucose  148 H


 


Calcium  8.3 L


 


Total Bilirubin  0.5


 


AST  26


 


Alkaline Phosphatase  82


 


Total Protein  7.5


 


Albumin  4.3


 


Lipase  244.7








                                        











  03/25/20 03/25/20 03/25/20





  14:52 17:26 17:26


 


Creatine Kinase   241 H 


 


CK-MB (CK-2)    3.15


 


NT-Pro-B Natriuret Pep  20562 H  














  03/25/20 03/25/20 03/26/20





  21:12 21:12 03:22


 


Creatine Kinase  217 H   226 H


 


CK-MB (CK-2)   2.80 


 


NT-Pro-B Natriuret Pep   














  03/26/20





  03:22


 


Creatine Kinase 


 


CK-MB (CK-2)  2.79


 


NT-Pro-B Natriuret Pep 











Impressions: 


                                        





Abdomen/Pelvis CT  03/25/20 00:00


IMPRESSION:  No acute findings.


 








Chest X-Ray  03/26/20 00:00


IMPRESSION:  NO ACUTE RADIOGRAPHIC FINDING IN THE CHEST.


 














Assessment & Plan





- Diagnosis


(1) Intractable vomiting


Qualifiers: 


   Vomiting type: unspecified   Nausea presence: with nausea   Qualified 

Code(s): R11.2 - Nausea with vomiting, unspecified   


Is this a current diagnosis for this admission?: Yes   


Plan: 


improved 








(2) End-stage renal disease on peritoneal dialysis


Is this a current diagnosis for this admission?: Yes   





- Time


Time Spent with patient: 35 or more minutes


Level of Care: TELE

## 2020-03-28 VITALS — SYSTOLIC BLOOD PRESSURE: 159 MMHG | DIASTOLIC BLOOD PRESSURE: 72 MMHG

## 2020-03-28 LAB
ANION GAP SERPL CALC-SCNC: 15 MMOL/L (ref 5–19)
BUN SERPL-MCNC: 60 MG/DL (ref 7–20)
CALCIUM: 8 MG/DL (ref 8.4–10.2)
CHLORIDE SERPL-SCNC: 95 MMOL/L (ref 98–107)
CO2 SERPL-SCNC: 27 MMOL/L (ref 22–30)
ERYTHROCYTE [DISTWIDTH] IN BLOOD BY AUTOMATED COUNT: 17.9 % (ref 11.5–14)
GLUCOSE SERPL-MCNC: 122 MG/DL (ref 75–110)
HCT VFR BLD CALC: 34.8 % (ref 37.9–51)
HGB BLD-MCNC: 12 G/DL (ref 13.5–17)
MCH RBC QN AUTO: 33.4 PG (ref 27–33.4)
MCHC RBC AUTO-ENTMCNC: 34.4 G/DL (ref 32–36)
MCV RBC AUTO: 97 FL (ref 80–97)
PLATELET # BLD: 171 10^3/UL (ref 150–450)
POTASSIUM SERPL-SCNC: 3.6 MMOL/L (ref 3.6–5)
RBC # BLD AUTO: 3.59 10^6/UL (ref 4.35–5.55)
WBC # BLD AUTO: 4 10^3/UL (ref 4–10.5)

## 2020-03-28 RX ADMIN — HEPARIN SODIUM SCH: 5000 INJECTION, SOLUTION INTRAVENOUS; SUBCUTANEOUS at 13:51

## 2020-03-28 RX ADMIN — FUROSEMIDE SCH MG: 80 TABLET ORAL at 09:18

## 2020-03-28 RX ADMIN — GENTAMICIN SULFATE SCH APPLIC: 1 CREAM TOPICAL at 09:18

## 2020-03-28 RX ADMIN — PANTOPRAZOLE SODIUM SCH MG: 40 INJECTION, POWDER, FOR SOLUTION INTRAVENOUS at 09:18

## 2020-03-28 RX ADMIN — INSULIN LISPRO SCH: 100 INJECTION, SOLUTION INTRAVENOUS; SUBCUTANEOUS at 11:29

## 2020-03-28 RX ADMIN — INSULIN LISPRO SCH: 100 INJECTION, SOLUTION INTRAVENOUS; SUBCUTANEOUS at 08:39

## 2020-03-28 RX ADMIN — HYDRALAZINE HYDROCHLORIDE SCH MG: 50 TABLET, FILM COATED ORAL at 05:26

## 2020-03-28 RX ADMIN — HEPARIN SODIUM SCH: 5000 INJECTION, SOLUTION INTRAVENOUS; SUBCUTANEOUS at 05:54

## 2020-03-28 RX ADMIN — DEXAMETHASONE SODIUM PHOSPHATE PRN MG: 10 INJECTION INTRAMUSCULAR; INTRAVENOUS at 04:07

## 2020-03-28 RX ADMIN — HYDRALAZINE HYDROCHLORIDE SCH MG: 50 TABLET, FILM COATED ORAL at 13:51

## 2020-03-28 NOTE — PDOC DISCHARGE SUMMARY
Impression





- Admit/DC Date/PCP


Admission Date/Primary Care Provider: 


  03/25/20 14:12





  SARBJIT CASTRO MD





Discharge Date: 03/28/20





- Discharge Diagnosis


(1) Intractable vomiting


Is this a current diagnosis for this admission?: Yes   





(2) End-stage renal disease on peritoneal dialysis


Is this a current diagnosis for this admission?: Yes   





- Additional Information


Discharge Diet: Diabetic


Discharge Activity: Activity As Tolerated


Referrals: 


CATHI FARMER MD [ACTIVE STAFF] - 


Home Medications: 








Atorvastatin Calcium [Lipitor 40 mg Tablet] 40 mg PO DAILY 03/25/20 


Furosemide [Lasix 80 mg Tablet] 80 mg PO Q12 03/25/20 


Gentamicin Sulfate [Garamycin 0.1% Cream 15 gm] 1 applic TOP ASDIR PRN 03/25/20 


Hydralazine HCl 100 mg PO Q8 03/25/20 











History of Present Illiness


History of Present Illness: 


CHOCO MONTESINOS JR is a 55 year old male, He has end-stage renal disease on 

peritoneal dialysis, he came to the office today for evaluation of vomiting for 

the last 2 days, he said he is not able to keep any food down in his stomach he 

has been vomiting potentially in the last 2 days.  I was particularly concerned 

about peritonitis in this patient because he is on PD the abdomen is not 

particularly tender on palpation but that by itself is not sensitive enough to 

diagnose peritonitis in this patient population.  He was admitted directly from 

outpatient into the hospital a CAT scan was obtained it was nondiagnostic there 

was no acute pathology demonstrated on the CAT scan.








Hospital Course


Hospital Course: 


Patient was admitted for the management of intractable vomiting, abdominal pain,

he has end-stage renal disease on peritoneal dialysis, he came to the office 

initially for evaluation of his symptoms.  I suspected he may have peritonitis 

partly because he is on peritoneal dialysis, abdominal sign is not sensitive in 

the diagnosis of peritonitis in population a patient with end-stage renal 

disease on peritoneal dialysis.The CAT scan of the abdomen and pelvis without 

contrast was negative for any acute pathology.  He was treated empirically with 

IV antibiotic ceftriaxone because of the suspicion for peritonitis because of 

the mode of presentation.  He was seen in consultation by nephrologist Dr. Chris Montelongo, the peritoneal fluid analysis was normal there was no evidence of

infection on the basis of the white cell count in the peritoneal fluid and the 

culture I requested for consultation from surgery for EGD because of the 

vomiting, the surgeon was concerned that patient may have coronavirus because he

felt his presentation is consistent with viral syndrome because of vomiting and 

he said he has a low-grade fever especially because no specific etiology was 

found for the vomiting, patient was isolated for COVID 19 rule out.I have a very

low suspicion for coronavirus he does not specifically meet the CDC guideline 

for coronavirus suspicion which includes, fever, cough, shortness of breath 

which is all absent in this patient, the influenza virus is rule out, patient is

stable enough at this time he has no more symptoms, he will be discharged home, 

he will self quarantine and follow the CDC guideline, the RN will educate him 

about what he needs to do at home





Physical Exam


Vital Signs: 


                                        











Temp Pulse Resp BP Pulse Ox


 


 99.3 F   101 H  17   159/72 H  99 


 


 03/28/20 14:38  03/28/20 14:38  03/28/20 14:38  03/28/20 14:38  03/28/20 14:38








                                 Intake & Output











 03/27/20 03/28/20 03/29/20





 06:59 06:59 06:59


 


Intake Total 7210 6890 4845


 


Output Total 7500 6400 5950


 


Balance -290 490 -1105


 


Weight 76.3 kg 79.1 kg 75.9 kg











General appearance: PRESENT: no acute distress


Eye exam: PRESENT: PERRLA


Respiratory exam: PRESENT: clear to auscultation nacho


Cardiovascular exam: PRESENT: +S1, +S2


GI/Abdominal exam: PRESENT: soft





Results


Laboratory Results: 


                                        











WBC  4.0 10^3/uL (4.0-10.5)   03/28/20  05:50    


 


RBC  3.59 10^6/uL (4.35-5.55)  L  03/28/20  05:50    


 


Hgb  12.0 g/dL (13.5-17.0)  L  03/28/20  05:50    


 


Hct  34.8 % (37.9-51.0)  L  03/28/20  05:50    


 


MCV  97 fl (80-97)   03/28/20  05:50    


 


MCH  33.4 pg (27.0-33.4)   03/28/20  05:50    


 


MCHC  34.4 g/dL (32.0-36.0)   03/28/20  05:50    


 


RDW  17.9 % (11.5-14.0)  H  03/28/20  05:50    


 


Plt Count  171 10^3/uL (150-450)   03/28/20  05:50    


 


Lymph % (Auto)  6.7 % (13-45)  L  03/25/20  14:52    


 


Mono % (Auto)  5.1 % (3-13)   03/25/20  14:52    


 


Eos % (Auto)  0.1 % (0-6)   03/25/20  14:52    


 


Baso % (Auto)  0.3 % (0-2)   03/25/20  14:52    


 


Absolute Neuts (auto)  5.5 10^3/uL (1.7-8.2)   03/25/20  14:52    


 


Absolute Lymphs (auto)  0.4 10^3/uL (0.5-4.7)  L  03/25/20  14:52    


 


Absolute Monos (auto)  0.3 10^3/uL (0.1-1.4)   03/25/20  14:52    


 


Absolute Eos (auto)  0.0 10^3/uL (0.0-0.6)   03/25/20  14:52    


 


Absolute Basos (auto)  0.0 10^3/uL (0.0-0.2)   03/25/20  14:52    


 


Seg Neutrophils %  87.8 % (42-78)  H  03/25/20  14:52    


 


PT  13.9 SEC (11.4-15.4)   03/25/20  14:52    


 


INR  1.07   03/25/20  14:52    


 


APTT  27.8 SEC (23.5-35.8)   03/25/20  14:52    


 


Sodium  136.7 mmol/L (137-145)  L  03/28/20  05:50    


 


Potassium  3.6 mmol/L (3.6-5.0)   03/28/20  05:50    


 


Chloride  95 mmol/L ()  L  03/28/20  05:50    


 


Carbon Dioxide  27 mmol/L (22-30)   03/28/20  05:50    


 


Anion Gap  15  (5-19)   03/28/20  05:50    


 


BUN  60 mg/dL (7-20)  H  03/28/20  05:50    


 


Creatinine  12.86 mg/dL (0.52-1.25)  H  03/28/20  05:50    


 


Est GFR ( Amer)  5  (>60)  L  03/28/20  05:50    


 


Est GFR (MDRD) Non-Af  4  (>60)  L  03/28/20  05:50    


 


Glucose  122 mg/dL ()  H  03/28/20  05:50    


 


POC Glucose  134 mg/dL ()  H  03/28/20  11:11    


 


Hemoglobin A1c %  5.1 % (4.7-6.0)   03/26/20  03:22    


 


Calcium  8.0 mg/dL (8.4-10.2)  L  03/28/20  05:50    


 


Phosphorus  6.5 mg/dL (2.5-4.5)  H  03/25/20  14:52    


 


Magnesium  1.5 mg/dL (1.6-2.3)  L  03/28/20  05:50    


 


Total Bilirubin  0.5 mg/dL (0.2-1.3)   03/27/20  12:45    


 


Direct Bilirubin  0.5 mg/dL (0.0-0.4)  H  03/27/20  12:45    


 


Neonat Total Bilirubin  Not Reportable   03/27/20  12:45    


 


Neonat Direct Bilirubin  Not Reportable   03/27/20  12:45    


 


Neonat Indirect Bili  Not Reportable   03/27/20  12:45    


 


AST  26 U/L (17-59)   03/27/20  12:45    


 


ALT  15 U/L (<50)   03/27/20  12:45    


 


Alkaline Phosphatase  82 U/L ()   03/27/20  12:45    


 


Ammonia  < 8.7 umol/L (9-33)  L  03/25/20  17:26    


 


Creatine Kinase  226 U/L ()  H  03/26/20  03:22    


 


CK-MB (CK-2)  2.79 ng/mL (<4.55)   03/26/20  03:22    


 


NT-Pro-B Natriuret Pep  93269 pg/mL (<125)  H  03/25/20  14:52    


 


Total Protein  7.5 g/dL (6.3-8.2)   03/27/20  12:45    


 


Albumin  4.3 g/dL (3.5-5.0)   03/27/20  12:45    


 


Triglycerides  165 mg/dL (<150)  H  03/26/20  03:22    


 


Cholesterol  192.98 mg/dL (0-200)   03/26/20  03:22    


 


LDL Cholesterol Direct  102 mg/dL (<100)  H  03/26/20  03:22    


 


VLDL Cholesterol  33.0 mg/dL (10-31)  H  03/26/20  03:22    


 


HDL Cholesterol  47 mg/dL (>40)   03/26/20  03:22    


 


Amylase  144 U/L ()  H  03/25/20  14:52    


 


Lipase  244.7 U/L ()   03/27/20  12:45    


 


TSH  2.22 uIU/mL (0.47-4.68)   03/25/20  14:52    


 


Free T4  1.00 ng/dL (0.78-2.19)   03/25/20  14:52    


 


Urine Color  YELLOW   03/25/20  17:05    


 


Urine Appearance  CLEAR   03/25/20  17:05    


 


Urine pH  7.0  (5.0-9.0)   03/25/20  17:05    


 


Ur Specific Gravity  1.012   03/25/20  17:05    


 


Urine Protein  >=500 mg/dL (NEGATIVE)  H  03/25/20  17:05    


 


Urine Glucose (UA)  >=500 mg/dL (NEGATIVE)  H  03/25/20  17:05    


 


Urine Ketones  NEGATIVE mg/dL (NEGATIVE)   03/25/20  17:05    


 


Urine Blood  NEGATIVE  (NEGATIVE)   03/25/20  17:05    


 


Urine Nitrite  NEGATIVE  (NEGATIVE)   03/25/20  17:05    


 


Urine Bilirubin  NEGATIVE  (NEGATIVE)   03/25/20  17:05    


 


Urine Urobilinogen  NEGATIVE mg/dL (<2.0)   03/25/20  17:05    


 


Ur Leukocyte Esterase  MODERATE  (NEGATIVE)  H  03/25/20  17:05    


 


Urine WBC (Auto)  51 /HPF  03/25/20  17:05    


 


Urine RBC (Auto)  3 /HPF  03/25/20  17:05    


 


Urine Bacteria (Auto)  TRACE /HPF  03/25/20  17:05    


 


Squamous Epi Cells Auto  2 /HPF  03/25/20  17:05    


 


Urine Mucus (Auto)  RARE /LPF  03/25/20  17:05    


 


Urine Ascorbic Acid  NEGATIVE  (NEGATIVE)   03/25/20  17:05    


 


Fluid Type  PERITONEAL   03/25/20  19:45    


 


Fluid Source  ABDOMEN   03/25/20  19:45    


 


Fluid Color  COLORLESS   03/25/20  19:45    


 


Fluid Appearance  CLEAR   03/25/20  19:45    


 


Fluid Viscosity  LIQUID   03/25/20  19:45    


 


Fluid WBC  2 /uL  03/25/20  19:45    


 


Fluid RBC  1 /uL  03/25/20  19:45    


 


Urine Opiates Screen  NEGATIVE   03/25/20  17:05    


 


Urine Methadone Screen  NEGATIVE   03/25/20  17:05    


 


Ur Barbiturates Screen  NEGATIVE   03/25/20  17:05    


 


Ur Phencyclidine Scrn  NEGATIVE   03/25/20  17:05    


 


Ur Amphetamines Screen  NEGATIVE   03/25/20  17:05    


 


U Benzodiazepines Scrn  NEGATIVE   03/25/20  17:05    


 


Urine Cocaine Screen  NEGATIVE   03/25/20  17:05    


 


U Marijuana (THC) Screen  NEGATIVE   03/25/20  17:05    


 


Influenza A (Rapid)  NEGATIVE  (NEGATIVE)   03/27/20  14:40    


 


Influenza B (Rapid)  NEGATIVE  (NEGATIVE)   03/27/20  14:40    


 


Group A Strep Rapid  NEGATIVE  (NEGATIVE)   03/27/20  17:41    








                                        











  03/25/20 03/25/20 03/25/20





  14:52 17:26 21:12


 


CK-MB (CK-2)   3.15  2.80


 


NT-Pro-B Natriuret Pep  49444 H  














  03/26/20





  03:22


 


CK-MB (CK-2)  2.79


 


NT-Pro-B Natriuret Pep 











Impressions: 


                                        





Abdomen/Pelvis CT  03/25/20 00:00


IMPRESSION:  No acute findings.


 








Chest X-Ray  03/26/20 00:00


IMPRESSION:  NO ACUTE RADIOGRAPHIC FINDING IN THE CHEST.


 














Stroke


Is this a Stroke Patient?: No





Acute Heart Failure





- **


Is this a Heart Failure Patient?: No

## 2020-05-26 ENCOUNTER — HOSPITAL ENCOUNTER (EMERGENCY)
Dept: HOSPITAL 62 - ER | Age: 55
Discharge: HOME | End: 2020-05-26
Payer: COMMERCIAL

## 2020-05-26 VITALS — SYSTOLIC BLOOD PRESSURE: 163 MMHG | DIASTOLIC BLOOD PRESSURE: 87 MMHG

## 2020-05-26 DIAGNOSIS — S39.91XA: Primary | ICD-10-CM

## 2020-05-26 DIAGNOSIS — I11.9: ICD-10-CM

## 2020-05-26 DIAGNOSIS — R10.30: ICD-10-CM
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
## 2020-05-26 DIAGNOSIS — Z99.2: ICD-10-CM

## 2020-05-26 DIAGNOSIS — J44.9: ICD-10-CM

## 2020-05-26 DIAGNOSIS — E11.22: ICD-10-CM

## 2020-05-26 DIAGNOSIS — N18.6: ICD-10-CM

## 2020-05-26 DIAGNOSIS — S16.1XXA: ICD-10-CM

## 2020-05-26 DIAGNOSIS — M54.2: ICD-10-CM

## 2020-05-26 PROCEDURE — 74176 CT ABD & PELVIS W/O CONTRAST: CPT

## 2020-05-26 PROCEDURE — 72125 CT NECK SPINE W/O DYE: CPT

## 2020-05-26 PROCEDURE — 99284 EMERGENCY DEPT VISIT MOD MDM: CPT
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
## 2020-07-13 ENCOUNTER — HOSPITAL ENCOUNTER (INPATIENT)
Dept: HOSPITAL 62 - OROUT | Age: 55
LOS: 5 days | Discharge: HOME HEALTH | DRG: 256 | End: 2020-07-18
Attending: INTERNAL MEDICINE | Admitting: INTERNAL MEDICINE
Payer: MEDICARE

## 2020-07-13 DIAGNOSIS — Z82.49: ICD-10-CM

## 2020-07-13 DIAGNOSIS — G90.9: ICD-10-CM

## 2020-07-13 DIAGNOSIS — L03.116: ICD-10-CM

## 2020-07-13 DIAGNOSIS — B95.2: ICD-10-CM

## 2020-07-13 DIAGNOSIS — Z83.3: ICD-10-CM

## 2020-07-13 DIAGNOSIS — E78.5: ICD-10-CM

## 2020-07-13 DIAGNOSIS — Z87.891: ICD-10-CM

## 2020-07-13 DIAGNOSIS — Z03.818: ICD-10-CM

## 2020-07-13 DIAGNOSIS — N25.81: ICD-10-CM

## 2020-07-13 DIAGNOSIS — J44.9: ICD-10-CM

## 2020-07-13 DIAGNOSIS — I12.9: ICD-10-CM

## 2020-07-13 DIAGNOSIS — N18.4: ICD-10-CM

## 2020-07-13 DIAGNOSIS — E78.00: ICD-10-CM

## 2020-07-13 DIAGNOSIS — E11.319: ICD-10-CM

## 2020-07-13 DIAGNOSIS — B96.6: ICD-10-CM

## 2020-07-13 DIAGNOSIS — E83.51: ICD-10-CM

## 2020-07-13 DIAGNOSIS — I96: ICD-10-CM

## 2020-07-13 DIAGNOSIS — E11.52: Primary | ICD-10-CM

## 2020-07-13 DIAGNOSIS — E87.5: ICD-10-CM

## 2020-07-13 DIAGNOSIS — D63.1: ICD-10-CM

## 2020-07-13 DIAGNOSIS — Z79.899: ICD-10-CM

## 2020-07-13 DIAGNOSIS — E11.22: ICD-10-CM

## 2020-07-13 DIAGNOSIS — Z83.6: ICD-10-CM

## 2020-07-13 DIAGNOSIS — E11.21: ICD-10-CM

## 2020-07-13 LAB
ADD MANUAL DIFF: NO
ALBUMIN SERPL-MCNC: 4 G/DL (ref 3.5–5)
ALP SERPL-CCNC: 73 U/L (ref 38–126)
ANION GAP SERPL CALC-SCNC: 15 MMOL/L (ref 5–19)
AST SERPL-CCNC: 21 U/L (ref 17–59)
BASOPHILS # BLD AUTO: 0.1 10^3/UL (ref 0–0.2)
BASOPHILS NFR BLD AUTO: 0.5 % (ref 0–2)
BILIRUB DIRECT SERPL-MCNC: 0.2 MG/DL (ref 0–0.4)
BILIRUB SERPL-MCNC: 0.5 MG/DL (ref 0.2–1.3)
BUN SERPL-MCNC: 54 MG/DL (ref 7–20)
CALCIUM: 9.7 MG/DL (ref 8.4–10.2)
CHLORIDE SERPL-SCNC: 93 MMOL/L (ref 98–107)
CO2 SERPL-SCNC: 29 MMOL/L (ref 22–30)
EOSINOPHIL # BLD AUTO: 0 10^3/UL (ref 0–0.6)
EOSINOPHIL NFR BLD AUTO: 0.3 % (ref 0–6)
ERYTHROCYTE [DISTWIDTH] IN BLOOD BY AUTOMATED COUNT: 16.1 % (ref 11.5–14)
GLUCOSE SERPL-MCNC: 133 MG/DL (ref 75–110)
HCT VFR BLD CALC: 30.1 % (ref 37.9–51)
HGB BLD-MCNC: 10.4 G/DL (ref 13.5–17)
LYMPHOCYTES # BLD AUTO: 0.7 10^3/UL (ref 0.5–4.7)
LYMPHOCYTES NFR BLD AUTO: 6.4 % (ref 13–45)
MCH RBC QN AUTO: 34.5 PG (ref 27–33.4)
MCHC RBC AUTO-ENTMCNC: 34.5 G/DL (ref 32–36)
MCV RBC AUTO: 100 FL (ref 80–97)
MONOCYTES # BLD AUTO: 0.6 10^3/UL (ref 0.1–1.4)
MONOCYTES NFR BLD AUTO: 5.9 % (ref 3–13)
NEUTROPHILS # BLD AUTO: 9.3 10^3/UL (ref 1.7–8.2)
NEUTS SEG NFR BLD AUTO: 86.9 % (ref 42–78)
PLATELET # BLD: 188 10^3/UL (ref 150–450)
POTASSIUM SERPL-SCNC: 3.6 MMOL/L (ref 3.6–5)
PROT SERPL-MCNC: 7.1 G/DL (ref 6.3–8.2)
RBC # BLD AUTO: 3.01 10^6/UL (ref 4.35–5.55)
TOTAL CELLS COUNTED % (AUTO): 100 %
WBC # BLD AUTO: 10.8 10^3/UL (ref 4–10.5)

## 2020-07-13 PROCEDURE — 93005 ELECTROCARDIOGRAM TRACING: CPT

## 2020-07-13 PROCEDURE — 80076 HEPATIC FUNCTION PANEL: CPT

## 2020-07-13 PROCEDURE — 82962 GLUCOSE BLOOD TEST: CPT

## 2020-07-13 PROCEDURE — 85027 COMPLETE CBC AUTOMATED: CPT

## 2020-07-13 PROCEDURE — 36415 COLL VENOUS BLD VENIPUNCTURE: CPT

## 2020-07-13 PROCEDURE — 88305 TISSUE EXAM BY PATHOLOGIST: CPT

## 2020-07-13 PROCEDURE — 71045 X-RAY EXAM CHEST 1 VIEW: CPT

## 2020-07-13 PROCEDURE — 87075 CULTR BACTERIA EXCEPT BLOOD: CPT

## 2020-07-13 PROCEDURE — 88311 DECALCIFY TISSUE: CPT

## 2020-07-13 PROCEDURE — 87070 CULTURE OTHR SPECIMN AEROBIC: CPT

## 2020-07-13 PROCEDURE — 87040 BLOOD CULTURE FOR BACTERIA: CPT

## 2020-07-13 PROCEDURE — 80048 BASIC METABOLIC PNL TOTAL CA: CPT

## 2020-07-13 PROCEDURE — 87186 SC STD MICRODIL/AGAR DIL: CPT

## 2020-07-13 PROCEDURE — 99285 EMERGENCY DEPT VISIT HI MDM: CPT

## 2020-07-13 PROCEDURE — 87635 SARS-COV-2 COVID-19 AMP PRB: CPT

## 2020-07-13 PROCEDURE — 93926 LOWER EXTREMITY STUDY: CPT

## 2020-07-13 PROCEDURE — 87205 SMEAR GRAM STAIN: CPT

## 2020-07-13 PROCEDURE — 90945 DIALYSIS ONE EVALUATION: CPT

## 2020-07-13 PROCEDURE — C9803 HOPD COVID-19 SPEC COLLECT: HCPCS

## 2020-07-13 PROCEDURE — 93010 ELECTROCARDIOGRAM REPORT: CPT

## 2020-07-13 PROCEDURE — 0Y6W0Z0 DETACHMENT AT LEFT 4TH TOE, COMPLETE, OPEN APPROACH: ICD-10-PCS | Performed by: SURGERY

## 2020-07-13 PROCEDURE — 85025 COMPLETE CBC W/AUTO DIFF WBC: CPT

## 2020-07-13 PROCEDURE — 0Y6Y0Z0 DETACHMENT AT LEFT 5TH TOE, COMPLETE, OPEN APPROACH: ICD-10-PCS | Performed by: SURGERY

## 2020-07-13 PROCEDURE — 87077 CULTURE AEROBIC IDENTIFY: CPT

## 2020-07-13 RX ADMIN — PIPERACILLIN AND TAZOBACTAM SCH MLS/HR: 2; .25 INJECTION, POWDER, LYOPHILIZED, FOR SOLUTION INTRAVENOUS; PARENTERAL at 15:42

## 2020-07-13 RX ADMIN — PIPERACILLIN AND TAZOBACTAM SCH MLS/HR: 2; .25 INJECTION, POWDER, LYOPHILIZED, FOR SOLUTION INTRAVENOUS; PARENTERAL at 21:59

## 2020-07-13 RX ADMIN — OXYCODONE AND ACETAMINOPHEN PRN TAB: 5; 325 TABLET ORAL at 23:16

## 2020-07-13 RX ADMIN — HYDRALAZINE HYDROCHLORIDE SCH MG: 50 TABLET, FILM COATED ORAL at 23:16
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
## 2020-07-13 NOTE — PDOC CONSULTATION
Consultation


Consult Date: 07/13/20


Attending physician:: CATHI FARMER


Provider Consulted: MAXIME BRAGG


Consult reason:: Infected and  ischemic left toes





History of Present Illness


Admission Date/PCP: 


  





  CATHI FARMER MD





History of Present Illness: 


CHOCO MONTESINOS JR is a 55 year old male





Presents to the emergency department complaining of a 3-day history of left foot

discoloration, black left fifth toe, pain, swelling.  Patient seen by Dr. Farmer today sent directly to the emergency department for further 

evaluation.  Surgery was consulted.  Patient denies history of trauma.  He has 

had ORIF of medial and lateral malleolus in the remote past.  Patient was felt 

to require emergent amputation of left fifth, possible left fourth toe and 

necessary debridement of the dorsal aspect of the left foot.  Patient will be 

admitted to Dr. Farmer service;





Past Medical History


Cardiac Medical History: Reports: Hyperlipidema, Hypertension


   Denies: Coronary Artery Disease, Myocardial Infarction


Pulmonary Medical History: Reports: Chronic Obstructive Pulmonary Disease (COPD)


   Denies: Asthma, Bronchitis, Pneumonia


EENT Medical History: Reports: None


Neurological Medical History: 


   Denies: Seizures


Endocrine Medical History: Reports: Diabetes Mellitus Type 2 - He has diabetic 

retinopathy and nephropathy.  There is proteinuria


Renal/ Medical History: Reports: End Stage Renal Disease


Malignancy Medical History: Reports: None


GI Medical History: 


   Denies: Hepatitis, Hiatal Hernia


Musculoskeltal Medical History: 


   Denies: Arthritis


Psychiatric Medical History: Reports: Depression


Hematology: Reports: Anemia


   Denies: Sickle Cell Disease





Past Surgical History


Past Surgical History: 





PD catheter placed 1.5 years ago


Past Surgical History: Reports: Orthopedic Surgery, Other - Left leg for plate 

in his ankle, also has plates in his shoulder and hip.


   Denies: Pacemaker





Social History


Information Source: Patient


Lives with: Family


Smoking Status: Former Smoker


Electronic Cigarette use?: No


Frequency of Alcohol Use: None


Hx Recreational Drug Use: No


Drugs: None


Hx Prescription Drug Abuse: No





Family History


Family History: None, CAD, COPD, DM, Hyperlipidemia, Hypertension


Parental Family History Reviewed: No


Children Family History Reviewed: No


Sibling(s) Family History Reviewed.: No





Medication/Allergy


Home Medications: 








Atorvastatin Calcium [Lipitor 40 mg Tablet] 40 mg PO DAILY 03/25/20 


Furosemide [Lasix 80 mg Tablet] 80 mg PO Q12 03/25/20 


Gentamicin Sulfate [Garamycin 0.1% Cream 15 gm] 1 applic TOP ASDIR PRN 03/25/20 


Hydralazine HCl 100 mg PO Q8 03/25/20 








Allergies/Adverse Reactions: 


                                        





No Known Allergies Allergy (Verified 07/30/19 17:02)


   











Review of Systems


Constitutional: ABSENT: chills, fever(s), headache(s), weight gain, weight loss


Eyes: ABSENT: visual disturbances


Ears: ABSENT: hearing changes


Gastrointestinal: ABSENT: abdominal pain, constipation, diarrhea, hematemesis, 

hematochezia, nausea, vomiting


Genitourinary: PRESENT: as per HPI, other - Patient makes very little urine.  

ABSENT: dysuria, hematuria


Neurological: PRESENT: other - Increased pain left foot


Psychiatric: ABSENT: anxiety, depression, homidical ideation, suicidal ideation


Endocrine: ABSENT: cold intolerance, heat intolerance, polydipsia, polyuria





Physical Exam


Vital Signs: 


                                        











Temp Pulse Resp BP Pulse Ox


 


 99.3 F   97   18   114/61   97 


 


 07/13/20 12:44  07/13/20 12:44  07/13/20 12:44  07/13/20 12:44  07/13/20 12:44








                                 Intake & Output











 07/12/20 07/13/20 07/14/20





 06:59 06:59 06:59


 


Weight   86.2 kg











General appearance: PRESENT: no acute distress


Head exam: PRESENT: normocephalic


Eye exam: PRESENT: other - Wearing glasses


Ear exam: PRESENT: TM's normal bilaterally


Mouth exam: PRESENT: dry mucosa


Neck exam: PRESENT: full ROM


Respiratory exam: PRESENT: clear to auscultation nacho


Cardiovascular exam: PRESENT: diastolic murmur, RRR


Pulses: PRESENT: normal carotid pulses, normal radial pulses, normal femoral 

pulses, other - Palpable popliteal pulses.  Unable to feel pulses in the feet; 

there are biphasic Doppler signals in both posterior tibial and anterior tibial 

arteries


GI/Abdominal exam: PRESENT: soft


Rectal exam: PRESENT: deferred


Extremities exam: PRESENT: full ROM - Black moist necrotic left fifth toe, left 

fourth toe with ischemic changes on plantar surface; bright erythematous 

streaking up to the forefoot ending to the dorsum of the ankle; moderate 

swelling.  Foot is warm


Neurological exam: PRESENT: oriented to person, oriented to place, oriented to 

time, oriented to situation


Skin exam: PRESENT: dry





Results


Laboratory Results: 


                                        





                                 07/13/20 13:45 





                                 07/13/20 13:45 





                                        











  07/13/20 07/13/20





  13:45 13:45


 


WBC  10.8 H 


 


RBC  3.01 L 


 


Hgb  10.4 L 


 


Hct  30.1 L 


 


MCV  100 H 


 


MCH  34.5 H 


 


MCHC  34.5 


 


RDW  16.1 H 


 


Plt Count  188 


 


Seg Neutrophils %  86.9 H 


 


Sodium   136.9 L


 


Potassium   3.6


 


Chloride   93 L


 


Carbon Dioxide   29


 


Anion Gap   15


 


BUN   54 H


 


Creatinine   13.94 H


 


Est GFR (African Amer)   4 L


 


Glucose   133 H


 


Calcium   9.7


 


Total Bilirubin   0.5


 


AST   21


 


Alkaline Phosphatase   73


 


Total Protein   7.1


 


Albumin   4.0














Assessment & Plan





- Diagnosis


(1) Gangrenous toe


Is this a current diagnosis for this admission?: Yes   


Plan: 


Impression: Wet gangrene of the left fifth toe, partially ischemic left fourth 

toe in a diabetic, cellulitis with streaking up the foot to the ankle in end-

stage renal failure patient, with peripheral vascular disease.  Agree with 

emergent admission, intravenous antibiotics and emergent surgery





Recommendations:





1.  We will check COVID status





2.  Keep n.p.o., plan operative debridement left foot including left fifth toe, 

probable left fourth toe and possible portion of the dorsum of left foot.





3.  I spoke with Dr. Farmer, primary care provider who will admit patient to 

his service.  I also spoke with Dr. Chris Montelongo, nephrologist, who will 

provide nephrologic care














(2) Gangrenous toe


Is this a current diagnosis for this admission?: Yes   





(3) Cellulitis of left foot


Is this a current diagnosis for this admission?: Yes   





(4) Chronic kidney disease, stage 4 (severe)


Is this a current diagnosis for this admission?: Yes   





(5) Diabetes mellitus


Is this a current diagnosis for this admission?: Yes   





(6) Hypertension


Is this a current diagnosis for this admission?: Yes   





- Time


Time Spent: 30 to 50 Minutes


Medications reviewed and adjusted accordingly: Yes


Anticipated discharge: Home





- Inpatient Certification


Based on my medical assessment, after consideration of the patient's 

comorbidities, presenting symptoms, or acuity I expect that the services needed 

warrant INPATIENT care.: Yes


I certify that my determination is in accordance with my understanding of 

Medicare's requirements for reasonable and necessary INPATIENT services [42 CFR 

412.3e].: Yes


Medical Necessity: Need For IV Fluids, Need for Pain Control, Need for IV 

Antibiotics, Need for Surgery

## 2020-07-13 NOTE — RADIOLOGY REPORT (SQ)
EXAM DESCRIPTION:  CHEST SINGLE VIEW



IMAGES COMPLETED DATE/TIME:  7/13/2020 3:41 pm



REASON FOR STUDY:  preop



COMPARISON:  3/26/2020



EXAM PARAMETERS:  NUMBER OF VIEWS: One view.

TECHNIQUE: Single frontal radiographic view of the chest acquired.

RADIATION DOSE: NA

LIMITATIONS: None.



FINDINGS:  LUNGS AND PLEURA: No consolidation or effusions.  No pneumothorax.  Asymmetric nodular den
sities in the left base are consistent with anterior rib ends.

MEDIASTINUM AND HILAR STRUCTURES: No masses.  Contour normal.

HEART AND VASCULAR STRUCTURES: Heart normal in size.  Normal vasculature.

BONES: Postsurgical changes in the left humerus stable in appearance.

HARDWARE: None in the chest.

OTHER: No other significant finding.



IMPRESSION:  NO ACUTE RADIOGRAPHIC FINDING IN THE CHEST.



TECHNICAL DOCUMENTATION:  JOB ID:  [id number]

 2011 [employer]- All Rights Reserved



Reading location - IP/workstation name: [email or web address]

## 2020-07-13 NOTE — ER DOCUMENT REPORT
HPI





- HPI


Time Seen by Provider: 07/13/20 13:04


Pain Level: 3


Context: 





Patient is a 55-year-old male with a history of diabetes, peritoneal dialysis 

who presents emergency department with a chief complaint of infected foot.  

Patient was sent over as a direct admit from Dr. Farmer with orders.  He 

states that he has had a discolored left fifth toe that started over the weekend

with drainage of blood.  Patient denies fever.





Patient last n.p.o. at 11 AM when eat chicken nuggets.





- REPRODUCTIVE


Reproductive: DENIES: Pregnant:





Past Medical History





- General


Information source: Patient





- Social History


Smoking Status: Never Smoker


Chew tobacco use (# tins/day): No


Frequency of alcohol use: None


Drug Abuse: None


Lives with: Family


Family History: CAD, COPD, DM, Hyperlipidemia, Hypertension


Patient has homicidal ideation: No





- Past Medical History


Cardiac Medical History: Reports: Hx Hypercholesterolemia, Hx Hypertension


   Denies: Hx Coronary Artery Disease, Hx Heart Attack


Pulmonary Medical History: Reports: Hx COPD


   Denies: Hx Asthma, Hx Bronchitis, Hx Pneumonia


EENT Medical History: Reports: None


Neurological Medical History: Denies: Hx Cerebrovascular Accident, Hx Seizures


Endocrine Medical History: Reports: Hx Diabetes Mellitus Type 2 - He has 

diabetic retinopathy and nephropathy.  There is proteinuria


Renal/ Medical History: Reports: Hx End Stage Renal Disease, Hx Peritoneal 

Dialysis, Hx Renal Insufficiency


Malignancy Medical History: Reports None


GI Medical History: Denies: Hx Hepatitis, Hx Hiatal Hernia, Hx Ulcer


Musculoskeletal Medical History: Denies Hx Arthritis


Psychiatric Medical History: Reports: Hx Depression


Infectious Medical History: Denies: Hx Hepatitis


Past Surgical History: Reports: Hx Orthopedic Surgery, Other - Left leg for 

plate in his ankle, also has plates in his shoulder and hip..  Denies: Hx Open 

Heart Surgery, Hx Pacemaker





- Immunizations


Hx Diphtheria, Pertussis, Tetanus Vaccination: Yes





Vertical Provider Document





- CONSTITUTIONAL


Agree With Documented VS: Yes


Exam Limitations: No Limitations


General Appearance: No Apparent Distress





- INFECTION CONTROL


TRAVEL OUTSIDE OF THE [place] IN LAST 30 DAYS: No





- HEENT


HEENT: Atraumatic, Normocephalic, PERRLA





- NECK


Neck: Normal Inspection





- RESPIRATORY


Respiratory: Breath Sounds Normal, No Respiratory Distress





- CARDIOVASCULAR


Cardiovascular: Regular Rate, Regular Rhythm





- GI/ABDOMEN


Gastrointestinal: Abdomen Soft, Abdomen Non-Tender





- MUSCULOSKELETAL/EXTREMETIES


Notes: 





Patient has discoloration, lack left fifth toe extending into the fourth toe.  

There is a streaking of redness up into the left lower leg.  Area is warm to 

touch.





- NEURO


Level of Consciousness: Awake, Alert, Appropriate





Course





- Re-evaluation


Re-evalutation: 





07/13/20 13:18


Spoke with Dr. Johnson for surgical consult.  Will see patient in ER.





NPO status; 11 am, ate two chicken nuggets.  Informed patient to remain NPO.  

Patient did recommend from Dr. Farmer and came with orders.  These orders 

were placed into the computer.  Bed requested.  ABX orders faxed to pharmacy by 

nurse. 





Patient stable, not tachycardic, hypotensive or febrile this time.





- Vital Signs


Vital signs: 


                                        











Temp Pulse Resp BP Pulse Ox


 


 99.3 F   97   18   114/61   97 


 


 07/13/20 12:44  07/13/20 12:44  07/13/20 12:44  07/13/20 12:44  07/13/20 12:44














- Laboratory


Result Diagrams: 


                                 07/13/20 13:45





                                 07/13/20 13:45





Discharge





- Discharge


Clinical Impression: 


 Toe infection, Left foot infection





Condition: Stable


Disposition: ADMITTED AS INPATIENT


Admitting Provider: Karel


Unit Admitted: Medical Floor


Referrals: 


CATHI FARMER MD [Primary Care Provider] - Follow up as needed

## 2020-07-13 NOTE — OPERATIVE REPORT
Operative Report


DATE OF SURGERY: 07/13/20


PREOPERATIVE DIAGNOSIS: 1.  Wet gangrene left fifth toe.  2.  Ischemic left fou

rth toe and forefoot.  3.  End-stage renal failure.  4.  Peripheral vascular 

disease.  5.  Diabetes mellitus


POSTOPERATIVE DIAGNOSIS: Same


OPERATION: 1.  Open amputation of left fifth and fourth toes including portions 

of metatarsal bones.  2.  Counterincision dorsum left foot with loop drain 

placement


SURGEON: MAXIME BRAGG


ANESTHESIA: Other - Peripheral foot block


TISSUE REMOVED OR ALTERED: Toes 5, 4 left foot, nonviable skin and subcutaneous 

tissue tendons and fascia


COMPLICATIONS: 





None


ESTIMATED BLOOD LOSS: 20 cc


INTRAOPERATIVE FINDINGS: See below


PROCEDURE: 





Patient was seen in preop holding area, left foot marked, patient taken to the 

main operating room where LMAC anesthesia was induced.  Patient's left foot 

underwent regional block l anesthesia at the level of the midfoot by the 

anesthesiologist.  Surgical plan and surgical timeout were conducted.





The left foot was prepped and draped in a sterile fashion with Betadine.  

Appropriate level of anesthesia was confirmed.  The left fifth and fourth toes 

were amputated at the webspace.  Bleeding was minimal.  Additional skin, 

subcutaneous tissue and tendons were debrided from the dorsum of the left foot. 

A counterincision was made distal to the ankle on the dorsal surface of the foot

and a tunneled tract was opened up meets the area of maximum erythematous 

streaking.  1/2 inch Penrose drain was passed through this tunnel out the 

counterincision and tied in a loop.





We irrigated the tract, as well as the hole in the distal foot at the amputation

site.  Portions  of the fifth and fourth metatarsal bones were debrided with 

rongeurs.  I felt the damage control operation was complete.  Sponge and counts 

are correct.  Wounds irrigated again with saline, packed with iodoform soaked 

gauze.  4 x 4's and Kerlix applied.  Patient tolerated the procedure well, taken

recovery room in stable condition.





Plan





1.  Keep leg elevated, follow-up on cultures





2.  Reexamine foot in the morning; foot is very concerning for progression of 

ischemia up the foot requiring additional debridement.  Will order arterial 

duplex study for tomorrow.

## 2020-07-14 RX ADMIN — PANTOPRAZOLE SODIUM SCH MG: 40 TABLET, DELAYED RELEASE ORAL at 09:08

## 2020-07-14 RX ADMIN — OXYCODONE AND ACETAMINOPHEN PRN TAB: 5; 325 TABLET ORAL at 22:09

## 2020-07-14 RX ADMIN — HYDRALAZINE HYDROCHLORIDE SCH MG: 50 TABLET, FILM COATED ORAL at 14:13

## 2020-07-14 RX ADMIN — PIPERACILLIN AND TAZOBACTAM SCH MLS/HR: 2; .25 INJECTION, POWDER, LYOPHILIZED, FOR SOLUTION INTRAVENOUS; PARENTERAL at 05:20

## 2020-07-14 RX ADMIN — FUROSEMIDE SCH MG: 80 TABLET ORAL at 09:08

## 2020-07-14 RX ADMIN — CALCIUM ACETATE SCH MG: 667 CAPSULE ORAL at 09:08

## 2020-07-14 RX ADMIN — HYDRALAZINE HYDROCHLORIDE SCH MG: 50 TABLET, FILM COATED ORAL at 22:08

## 2020-07-14 RX ADMIN — PIPERACILLIN AND TAZOBACTAM SCH MLS/HR: 2; .25 INJECTION, POWDER, LYOPHILIZED, FOR SOLUTION INTRAVENOUS; PARENTERAL at 22:08

## 2020-07-14 RX ADMIN — OXYCODONE AND ACETAMINOPHEN PRN TAB: 5; 325 TABLET ORAL at 05:27

## 2020-07-14 RX ADMIN — CALCIUM ACETATE SCH MG: 667 CAPSULE ORAL at 12:06

## 2020-07-14 RX ADMIN — CALCIUM ACETATE SCH MG: 667 CAPSULE ORAL at 17:25

## 2020-07-14 RX ADMIN — OXYCODONE AND ACETAMINOPHEN PRN TAB: 5; 325 TABLET ORAL at 14:14

## 2020-07-14 RX ADMIN — CALCITRIOL SCH MCG: 0.25 CAPSULE, LIQUID FILLED ORAL at 09:08

## 2020-07-14 RX ADMIN — HYDRALAZINE HYDROCHLORIDE SCH: 50 TABLET, FILM COATED ORAL at 07:52

## 2020-07-14 RX ADMIN — FUROSEMIDE SCH MG: 80 TABLET ORAL at 17:26

## 2020-07-14 RX ADMIN — POTASSIUM CHLORIDE SCH MEQ: 750 TABLET, FILM COATED, EXTENDED RELEASE ORAL at 09:08

## 2020-07-14 NOTE — PDOC CONSULTATION
Consultation


Consult Date: 07/14/20


Attending physician:: CATHI FARMER


Provider Consulted: YOKO LANDERS


Consult reason:: ESRD for PD.





History of Present Illness


Admission Date/PCP: 


  07/13/20 15:15





  CATHI FARMER MD





History of Present Illness: 


CHOCO MONTESINOS JR is a 55 year old male with the history of ESRD on peritoneal

dialysis in the background of diabetes mellitus, hypertension, hyperlipidemia 

was admitted with history of left foot discoloration of lateral toes 2 to 3 days

duration with minimal pain.  He has been seen and evaluated by surgery and has 

undergone emergent amputation of left fifth and fourth toe with debridement. 

Patient has been undergoing peritoneal dialysis as per orders placed by me 

yesterday.  Further discussions were done with his treating nurse today.  

Patient currently feels great.  He says he hardly has any pain.  Labs and 

medications were reviewed.








Past Medical History


Cardiac Medical History: Reports: Hyperlipidemia, Hypertension-primary


   Denies: Coronary Artery Disease, Myocardial Infarction


Pulmonary Medical History: Reports: Chronic Obstructive Pulmonary Disease (COPD)


   Denies: Asthma, Bronchitis, Pneumonia


EENT Medical History: Reports: None


Neurological Medical History: 


   Denies: Seizures


Endocrine Medical History: Reports: Diabetes Mellitus Type 2 - He has diabetic 

retinopathy and nephropathy.  There is proteinuria


Complications of Diabetes: Reports: Autonomic Neuropathy, Nephropathy, 

Retinopathy


Renal/ Medical History: Reports: End Stage Renal Disease, Hypocalcemia, 

Hyperkalemia, Hyperphosphatemia, Secondary Hyperparathyroidism


Malignancy Medical History: Reports: None


GI Medical History: 


   Denies: Hepatitis, Hiatal Hernia


Musculoskeltal Medical History: 


   Denies: Arthritis


Psychiatric Medical History: Reports: Depression


Hematology Medical History: Reports Anemia of Chronic Kidney Disease





Past Surgical History


Past Surgical History: Reports: Orthopedic Surgery, Other - Left leg for plate 

in his ankle, also has plates in his shoulder and hip.


   Denies: Pacemaker





Social History


Lives with: Family


Smoking Status: Former Smoker


Electronic Cigarette use?: No


Frequency of Alcohol Use: None


Hx Recreational Drug Use: No


Drugs: None


Hx Prescription Drug Abuse: No





Family History


Parental Family History Reviewed: Yes - Negative for  ESRD


Children Family History Reviewed: No


Sibling(s) Family History Reviewed.: No





Medication/Allergy


Home Medications: 








Furosemide [Lasix 80 mg Tablet] 80 mg PO Q12 03/25/20 


Hydralazine HCl 100 mg PO Q8 03/25/20 


Calcitriol [Rocaltrol] 0.5 mcg PO DAILY 07/13/20 


Calcium Acetate [Phoslo 667 Mg Capsule] 1,334 mg PO AC 07/13/20 


Calcium Acetate [Phoslo 667 Mg Capsule] 667 mg PO .WITH SNACKS 07/13/20 


Pantoprazole Sodium [Protonix 40 mg Dr Tablet] 40 mg PO QAM 07/13/20 


Potassium Chloride [Klor-Con 10 Meq Tablet ER] 10 meq PO DAILY 07/13/20 








Allergies/Adverse Reactions: 


                                        





No Known Allergies Allergy (Verified 07/30/19 17:02)


   











Review of Systems


Constitutional: ABSENT: anorexia, chills, fatigue, fever(s), headache(s), night 

sweats, weakness


Ears: ABSENT: hearing changes


Nose, Mouth, and Throat: ABSENT: mouth pain, sore throat


Cardiovascular: ABSENT: chest pain, dyspnea on exertion, edema, orthropnea, 

palpitations


Respiratory: ABSENT: dyspnea, hemoptysis


Gastrointestinal: ABSENT: abdominal pain, diarrhea, dysphagia


Genitourinary: ABSENT: dysuria, hematuria


Musculoskeletal: ABSENT: deformity, joint swelling


Integumentary: PRESENT: other - Discoloration of left lip lateral toes..  

ABSENT: lesions, pruritus


Neurological: ABSENT: abnormal movements, abnormal speech, confusion, 

convulsions, lack of coordination


Psychiatric: ABSENT: anxiety


Hematologic/Lymphatic: ABSENT: easy bruising, lymphadenopathy





Physical Exam


Vital Signs: 


                                        











Temp Pulse Resp BP Pulse Ox


 


 98.0 F   94   16   129/87 H  94 


 


 07/14/20 07:43  07/14/20 08:17  07/14/20 07:43  07/14/20 08:17  07/14/20 07:43








                                 Intake & Output











 07/13/20 07/14/20 07/15/20





 06:59 06:59 06:59


 


Intake Total  4450 


 


Output Total  2010 2600


 


Balance  2440 -2600


 


Weight  74.8 kg 











General appearance: PRESENT: no acute distress


Neck exam: PRESENT: meningismus


Respiratory exam: PRESENT: clear to auscultation nacho, decreased breath sounds.  

ABSENT: crackles


Cardiovascular exam: PRESENT: +S1, +S2


GI/Abdominal exam: PRESENT: normal bowel sounds, soft.  ABSENT: organomegaly, 

tenderness


Extremities exam: ABSENT: pedal edema


Neurological exam: PRESENT: alert, awake, oriented to person, oriented to place


Psychiatric exam: PRESENT: appropriate affect





Results


Laboratory Results: 


                                        





                                 07/13/20 13:45 





                                 07/13/20 13:45 





                                        











  07/13/20 07/13/20





  13:45 13:45


 


WBC  10.8 H 


 


RBC  3.01 L 


 


Hgb  10.4 L 


 


Hct  30.1 L 


 


MCV  100 H 


 


MCH  34.5 H 


 


MCHC  34.5 


 


RDW  16.1 H 


 


Plt Count  188 


 


Seg Neutrophils %  86.9 H 


 


Sodium   136.9 L


 


Potassium   3.6


 


Chloride   93 L


 


Carbon Dioxide   29


 


Anion Gap   15


 


BUN   54 H


 


Creatinine   13.94 H


 


Est GFR (African Amer)   4 L


 


Glucose   133 H


 


Calcium   9.7


 


Total Bilirubin   0.5


 


AST   21


 


Alkaline Phosphatase   73


 


Total Protein   7.1


 


Albumin   4.0











Impressions: 


                                        





Chest X-Ray  07/13/20 15:07


IMPRESSION:  NO ACUTE RADIOGRAPHIC FINDING IN THE CHEST.


 








Extremity Arterial Study  07/13/20 20:00


IMPRESSION:  Possible inflow stenosis.  Stenosis mid femoral artery not 

significantly changed.  Small vessel disease.


 














Assessment & Plan





- Diagnosis


(1) Diabetic infection of left foot


Plan: 


Status post amputation yesterday of  involved toes and done well.  Uneventful.








(2) End-stage renal disease on peritoneal dialysis


Plan: 


Patient has been undergoing peritoneal dialysis as per orders placed by me 

yesterday.  Uneventful.  Once he is discharged he could carry on with his 

regular home orders.








(3) Hypertension


Is this a current diagnosis for this admission?: Yes   


Plan: 


Controlled.








(4) Type 2 diabetes mellitus


Qualifiers: 


   Diabetes mellitus long term insulin use: with long term use   Diabetes 

mellitus complication status: with unspecified complications 


Plan: 


Advised tight control.

## 2020-07-14 NOTE — PDOC PROGRESS REPORT
Subjective


Progress Note for:: 07/14/20


Subjective:: 





feels ok





Reason For Visit: 


TOE INFECTION,LEFT FOOT INFECTION








Physical Exam


Vital Signs: 


                                        











Temp Pulse Resp BP Pulse Ox


 


 98.5 F   77   18   138/63 H  96 


 


 07/13/20 23:13  07/14/20 03:15  07/13/20 23:13  07/14/20 03:15  07/13/20 23:13








                                 Intake & Output











 07/13/20 07/14/20 07/15/20





 06:59 06:59 06:59


 


Intake Total  4450 


 


Output Total  2010 


 


Balance  2440 


 


Weight  74.8 kg 











General appearance: PRESENT: no acute distress


Head exam: PRESENT: normocephalic


Eye exam: PRESENT: EOMI


Ear exam: PRESENT: normal external ear exam


Mouth exam: PRESENT: moist


Neck exam: PRESENT: full ROM


Respiratory exam: PRESENT: clear to auscultation nacho


Cardiovascular exam: PRESENT: RRR


Pulses: PRESENT: other - weak pulse dp and pt.left foot


Vascular exam: PRESENT: normal capillary refill


Breast: PRESENT: Normal


GI/Abdominal exam: PRESENT: soft


Rectal exam: PRESENT: deferred


Extremities exam: PRESENT: full ROM, other - left foot amp site clean, pack 

removed


Neurological exam: PRESENT: alert


Psychiatric exam: PRESENT: appropriate affect


Skin exam: PRESENT: dry





Results


Laboratory Results: 


                                        





                                 07/13/20 13:45 





                                 07/13/20 13:45 





                                        











  07/13/20 07/13/20





  13:45 13:45


 


WBC  10.8 H 


 


RBC  3.01 L 


 


Hgb  10.4 L 


 


Hct  30.1 L 


 


MCV  100 H 


 


MCH  34.5 H 


 


MCHC  34.5 


 


RDW  16.1 H 


 


Plt Count  188 


 


Seg Neutrophils %  86.9 H 


 


Sodium   136.9 L


 


Potassium   3.6


 


Chloride   93 L


 


Carbon Dioxide   29


 


Anion Gap   15


 


BUN   54 H


 


Creatinine   13.94 H


 


Est GFR (African Amer)   4 L


 


Glucose   133 H


 


Calcium   9.7


 


Total Bilirubin   0.5


 


AST   21


 


Alkaline Phosphatase   73


 


Total Protein   7.1


 


Albumin   4.0











Impressions: 


                                        





Chest X-Ray  07/13/20 15:07


IMPRESSION:  NO ACUTE RADIOGRAPHIC FINDING IN THE CHEST.


 














Assessment & Plan





- Plan Summary


Plan Summary: 





diabetic foot infection left foot


s/p left 4 and 5 toe amp


wound clean


start wet to dry dressing changes


pt could be discharged home


can f/u in surgery clinic in a week


cont wet to dry dressing changes left foot


with gauze and ns. 


reonsult surgery as necessary.

## 2020-07-14 NOTE — PDOC PROGRESS REPORT
Subjective


Progress Note for:: 07/14/20


Subjective:: 





Patient was seen today by the bedside, he has no new complaints he is feeling 

better


Reason For Visit: 


GANGRENOUS TOE,CELLULITIS OF LEFT FOOT,CHRONIC KID








Physical Exam


Vital Signs: 


                                        











Temp Pulse Resp BP Pulse Ox


 


 98.7 F   80   16   123/58 L  96 


 


 07/14/20 15:12  07/14/20 18:47  07/14/20 15:12  07/14/20 18:47  07/14/20 15:12








                                 Intake & Output











 07/13/20 07/14/20 07/15/20





 06:59 06:59 06:59


 


Intake Total  4450 4300


 


Output Total  2010 5800


 


Balance  2440 -1500


 


Weight  74.8 kg 











General appearance: PRESENT: no acute distress, well-developed, well-nourished


Head exam: PRESENT: atraumatic, normocephalic


Eye exam: PRESENT: conjunctiva pink, EOMI, PERRLA


Ear exam: PRESENT: normal external ear exam


Mouth exam: PRESENT: moist, tongue midline


Neck exam: PRESENT: full ROM


Respiratory exam: PRESENT: clear to auscultation nacho


Cardiovascular exam: PRESENT: RRR, +S1, +S2


Pulses: PRESENT: normal dorsalis pedis pul, +2 pedal pulses bilateral


Vascular exam: PRESENT: normal capillary refill


GI/Abdominal exam: PRESENT: normal bowel sounds, soft


Rectal exam: PRESENT: deferred


Neurological exam: PRESENT: alert, CN II-XII grossly intact


Psychiatric exam: PRESENT: appropriate affect, normal mood


Skin exam: PRESENT: dry, intact, warm.  ABSENT: cyanosis, rash





Results


Laboratory Results: 


                                        





                                 07/13/20 13:45 





                                 07/13/20 13:45 








Impressions: 


                                        





Chest X-Ray  07/13/20 15:07


IMPRESSION:  NO ACUTE RADIOGRAPHIC FINDING IN THE CHEST.


 








Extremity Arterial Study  07/13/20 20:00


IMPRESSION:  Possible inflow stenosis.  Stenosis mid femoral artery not 

significantly changed.  Small vessel disease.


 














Assessment & Plan





- Diagnosis


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


Is this a current diagnosis for this admission?: Yes   


Plan: 


Status post amputation of the toes








(2) Peripheral vascular disease


Is this a current diagnosis for this admission?: Yes   





(3) End stage renal disease on dialysis


Is this a current diagnosis for this admission?: Yes   


Plan: 


Management per Dr. Chris Montelongo nephrology








(4) Cellulitis of left foot


Is this a current diagnosis for this admission?: Yes   


Plan: 


He will continue IV antibiotic








- Time


Time Spent with patient: 25-34 minutes


Level of Care: MEDICAL


Medications reviewed and adjusted accordingly: Yes

## 2020-07-14 NOTE — RADIOLOGY REPORT (SQ)
EXAM DESCRIPTION:  ARTERIAL LOWER EXTREM UNILAT



IMAGES COMPLETED DATE/TIME:  7/13/2020 9:19 pm



REASON FOR STUDY:  Evaluate peripheral vascular status left leg



COMPARISON:  11/19/2019



TECHNIQUE:  Dynamic and static gray scale and color images acquired of the left lower extremity arter
ies.



LIMITATIONS:  None.



FINDINGS:  Monophasic waveforms throughout the lower extremity suggesting inflow stenosis.  Elevated 
velocity mid femoral artery 2.7 m/sec not significantly changed.  Popliteal artery is patent.  Three-
vessel runoff with collaterals in the dorsalis pedis.



IMPRESSION:  Possible inflow stenosis.  Stenosis mid femoral artery not significantly changed.  Small
 vessel disease.



COMMENT:  [facility]

NORMAL: Greater than 1.0

MINIMAL DISEASE: 0.9 to 1.0

CLAUDICATION: 0.5 to 0.9

SEVERE ARTERIAL DISEASE:  Less than 0.5

[facility]

NORMAL: Greater than 1.0 (1.2 If Heavy Calcifications)

NORMAL TO MILD ISCHEMIA: 0.8 to 1.0

MODERATE ISCHEMIA: 0.4 to 0.8

SEVERE ISCHEMIA:  Less than 0.4



TECHNICAL DOCUMENTATION:  JOB ID:  [id number]

 2011 [employer]- All Rights Reserved



Reading location - IP[facility]workstation name: [email or web address]

## 2020-07-15 RX ADMIN — CALCIUM ACETATE SCH MG: 667 CAPSULE ORAL at 17:31

## 2020-07-15 RX ADMIN — FUROSEMIDE SCH MG: 80 TABLET ORAL at 09:42

## 2020-07-15 RX ADMIN — CALCITRIOL SCH MCG: 0.25 CAPSULE, LIQUID FILLED ORAL at 09:42

## 2020-07-15 RX ADMIN — OXYCODONE AND ACETAMINOPHEN PRN TAB: 5; 325 TABLET ORAL at 06:28

## 2020-07-15 RX ADMIN — OXYCODONE AND ACETAMINOPHEN PRN TAB: 5; 325 TABLET ORAL at 13:13

## 2020-07-15 RX ADMIN — POTASSIUM CHLORIDE SCH MEQ: 750 TABLET, FILM COATED, EXTENDED RELEASE ORAL at 09:42

## 2020-07-15 RX ADMIN — PIPERACILLIN AND TAZOBACTAM SCH MLS/HR: 2; .25 INJECTION, POWDER, LYOPHILIZED, FOR SOLUTION INTRAVENOUS; PARENTERAL at 09:44

## 2020-07-15 RX ADMIN — PIPERACILLIN AND TAZOBACTAM SCH MLS/HR: 2; .25 INJECTION, POWDER, LYOPHILIZED, FOR SOLUTION INTRAVENOUS; PARENTERAL at 22:01

## 2020-07-15 RX ADMIN — OXYCODONE AND ACETAMINOPHEN PRN TAB: 5; 325 TABLET ORAL at 22:21

## 2020-07-15 RX ADMIN — HYDRALAZINE HYDROCHLORIDE SCH MG: 50 TABLET, FILM COATED ORAL at 22:01

## 2020-07-15 RX ADMIN — INSULIN LISPRO SCH: 100 INJECTION, SOLUTION INTRAVENOUS; SUBCUTANEOUS at 17:32

## 2020-07-15 RX ADMIN — HYDRALAZINE HYDROCHLORIDE SCH MG: 50 TABLET, FILM COATED ORAL at 06:28

## 2020-07-15 RX ADMIN — CALCIUM ACETATE SCH MG: 667 CAPSULE ORAL at 11:16

## 2020-07-15 RX ADMIN — INSULIN LISPRO SCH: 100 INJECTION, SOLUTION INTRAVENOUS; SUBCUTANEOUS at 22:00

## 2020-07-15 RX ADMIN — PANTOPRAZOLE SODIUM SCH MG: 40 TABLET, DELAYED RELEASE ORAL at 07:38

## 2020-07-15 RX ADMIN — FUROSEMIDE SCH MG: 80 TABLET ORAL at 17:31

## 2020-07-15 RX ADMIN — HYDRALAZINE HYDROCHLORIDE SCH MG: 50 TABLET, FILM COATED ORAL at 13:51

## 2020-07-15 RX ADMIN — CALCIUM ACETATE SCH MG: 667 CAPSULE ORAL at 07:38

## 2020-07-15 NOTE — PDOC PROGRESS REPORT
Subjective


Progress Note for:: 07/15/20


Subjective:: 





No pains


Reason For Visit: 


GANGRENOUS TOE,CELLULITIS OF LEFT FOOT,CHRONIC KID


No pains





Physical Exam


Vital Signs: 


                                        











Temp Pulse Resp BP Pulse Ox


 


 98.0 F   82   18   131/54 H  96 


 


 07/15/20 07:43  07/15/20 07:43  07/15/20 07:43  07/15/20 07:43  07/15/20 07:43








                                 Intake & Output











 07/14/20 07/15/20 07/16/20





 06:59 06:59 06:59


 


Intake Total 4450 6910 2000


 


Output Total 2010 7800 1500


 


Balance 2440 -890 500


 


Weight 74.8 kg 74.8 kg 72.9 kg











Exam: 





Amputation left 4th,5th toes look dry


Rubber drain in place


Unable to palpate ankle pulses


Afebrile with normal WBC





Results


Laboratory Results: 


                                        





                                 07/13/20 13:45 





                                 07/13/20 13:45 





                                        





07/13/20 17:16   Toe - Amputation Site   Gram Stain - Final








Impressions: 


                                        





Chest X-Ray  07/13/20 15:07


IMPRESSION:  NO ACUTE RADIOGRAPHIC FINDING IN THE CHEST.


 








Extremity Arterial Study  07/13/20 20:00


IMPRESSION:  Possible inflow stenosis.  Stenosis mid femoral artery not 

significantly changed.  Small vessel disease.


 














Assessment & Plan





- Diagnosis


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


Is this a current diagnosis for this admission?: Yes   





(2) Peripheral vascular disease


Is this a current diagnosis for this admission?: Yes   





(3) Anemia in chronic kidney disease


Qualifiers: 


   Chronic kidney disease stage: on chronic dialysis   Qualified Code(s): N18.6 

- End stage renal disease; D63.1 - Anemia in chronic kidney disease; Z99.2 - 

Dependence on renal dialysis   


Is this a current diagnosis for this admission?: Yes   





- Time


Critical Time spent with patient: 15-24 minutes





- Inpatient Certification


Medical Necessity: Need for IV Antibiotics





- Plan Summary


Plan Summary: 





56 yo diabetic post amp left toes for wet gangrenePOD 2





Amp site looks dry but no granulation tissue. Patient informed possibility of 

left BKA in near future especially if wound does not heal. May need vascular 

evaluation C/o [facility] vascular surgeon.  I have discussed this with the 

patient's primary physician.





Plan: Continue antibiotics and daily wet-to-dry dressings to left fourth and 

fifth toe amputation site


Suggest vascular surgical consultation


We can follow the patient in the surgical clinic in about a week if not seen by 

vascular surgery


Will sign off

## 2020-07-15 NOTE — PDOC PROGRESS REPORT
Subjective


Progress Note for:: 07/15/20


Subjective:: 





Patient seen by the bedside status post amputation of toes 4 and 5 of the left 

foot he has underlying PAD, he will need evaluation by vascular surgery after 

discharge


Reason For Visit: 


GANGRENOUS TOE,CELLULITIS OF LEFT FOOT,CHRONIC KID








Physical Exam


Vital Signs: 


                                        











Temp Pulse Resp BP Pulse Ox


 


 98.5 F   73   16   160/64 H  95 


 


 07/15/20 11:09  07/15/20 12:55  07/15/20 11:09  07/15/20 12:55  07/15/20 11:09








                                 Intake & Output











 07/14/20 07/15/20 07/16/20





 06:59 06:59 06:59


 


Intake Total 4450 6910 4286


 


Output Total 2010 7800 1500


 


Balance 2440 -890 2786


 


Weight 74.8 kg 74.8 kg 71.7 kg











General appearance: PRESENT: no acute distress


Eye exam: PRESENT: PERRLA


Respiratory exam: PRESENT: clear to auscultation nacho


Cardiovascular exam: PRESENT: +S1, +S2


GI/Abdominal exam: PRESENT: soft


Neurological exam: PRESENT: alert





Results


Laboratory Results: 


                                        





                                 07/13/20 13:45 





                                 07/13/20 13:45 





                                        





07/13/20 17:16   Toe - Amputation Site   Gram Stain - Final








Impressions: 


                                        





Chest X-Ray  07/13/20 15:07


IMPRESSION:  NO ACUTE RADIOGRAPHIC FINDING IN THE CHEST.


 








Extremity Arterial Study  07/13/20 20:00


IMPRESSION:  Possible inflow stenosis.  Stenosis mid femoral artery not 

significantly changed.  Small vessel disease.


 














Assessment & Plan





- Diagnosis


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


Is this a current diagnosis for this admission?: Yes   


Plan: 


He has polymicrobial infection based on the culture from the wound, continue a

ntibiotic








(2) Peripheral vascular disease


Is this a current diagnosis for this admission?: Yes   





(3) End stage renal disease on dialysis


Is this a current diagnosis for this admission?: Yes   





(4) Cellulitis of left foot


Is this a current diagnosis for this admission?: Yes   





- Time


Time Spent with patient: 25-34 minutes


Level of Care: [facility]

## 2020-07-16 RX ADMIN — POTASSIUM CHLORIDE SCH MEQ: 750 TABLET, FILM COATED, EXTENDED RELEASE ORAL at 09:24

## 2020-07-16 RX ADMIN — PIPERACILLIN AND TAZOBACTAM SCH MLS/HR: 2; .25 INJECTION, POWDER, LYOPHILIZED, FOR SOLUTION INTRAVENOUS; PARENTERAL at 21:37

## 2020-07-16 RX ADMIN — INSULIN LISPRO SCH: 100 INJECTION, SOLUTION INTRAVENOUS; SUBCUTANEOUS at 16:17

## 2020-07-16 RX ADMIN — CALCIUM ACETATE SCH: 667 CAPSULE ORAL at 20:01

## 2020-07-16 RX ADMIN — CALCIUM ACETATE SCH MG: 667 CAPSULE ORAL at 07:42

## 2020-07-16 RX ADMIN — PANTOPRAZOLE SODIUM SCH MG: 40 TABLET, DELAYED RELEASE ORAL at 07:42

## 2020-07-16 RX ADMIN — HYDRALAZINE HYDROCHLORIDE SCH MG: 50 TABLET, FILM COATED ORAL at 14:42

## 2020-07-16 RX ADMIN — CALCITRIOL SCH MCG: 0.25 CAPSULE, LIQUID FILLED ORAL at 09:24

## 2020-07-16 RX ADMIN — INSULIN LISPRO SCH: 100 INJECTION, SOLUTION INTRAVENOUS; SUBCUTANEOUS at 12:25

## 2020-07-16 RX ADMIN — CALCIUM ACETATE SCH: 667 CAPSULE ORAL at 12:26

## 2020-07-16 RX ADMIN — HYDRALAZINE HYDROCHLORIDE SCH: 50 TABLET, FILM COATED ORAL at 21:43

## 2020-07-16 RX ADMIN — INSULIN LISPRO SCH: 100 INJECTION, SOLUTION INTRAVENOUS; SUBCUTANEOUS at 21:37

## 2020-07-16 RX ADMIN — INSULIN LISPRO SCH: 100 INJECTION, SOLUTION INTRAVENOUS; SUBCUTANEOUS at 07:46

## 2020-07-16 RX ADMIN — OXYCODONE AND ACETAMINOPHEN PRN TAB: 5; 325 TABLET ORAL at 14:42

## 2020-07-16 RX ADMIN — FUROSEMIDE SCH MG: 80 TABLET ORAL at 09:24

## 2020-07-16 RX ADMIN — FUROSEMIDE SCH MG: 80 TABLET ORAL at 20:21

## 2020-07-16 RX ADMIN — PIPERACILLIN AND TAZOBACTAM SCH MLS/HR: 2; .25 INJECTION, POWDER, LYOPHILIZED, FOR SOLUTION INTRAVENOUS; PARENTERAL at 09:24

## 2020-07-16 RX ADMIN — HYDRALAZINE HYDROCHLORIDE SCH: 50 TABLET, FILM COATED ORAL at 07:05

## 2020-07-16 NOTE — PDOC PROGRESS REPORT
Subjective


Progress Note for:: 07/16/20


Subjective:: 





Patient seen by the bedside, he has underlying PAD hopefully discharge in a day 

or 2, will follow outpatient with vascular surgery


Reason For Visit: 


GANGRENOUS TOE,CELLULITIS OF LEFT FOOT,CHRONIC KID








Physical Exam


Vital Signs: 


                                        











Temp Pulse Resp BP Pulse Ox


 


 98.8 F   71   16   127/51 H  96 


 


 07/16/20 14:57  07/16/20 14:57  07/16/20 14:57  07/16/20 14:57  07/16/20 14:57








                                 Intake & Output











 07/15/20 07/16/20 07/17/20





 06:59 06:59 06:59


 


Intake Total 6910 8896 2250


 


Output Total 7800 4900 3000


 


Balance -890 3996 -750


 


Weight 74.8 kg 67.5 kg 68.5 kg











General appearance: PRESENT: no acute distress


Eye exam: PRESENT: PERRLA


Respiratory exam: PRESENT: clear to auscultation nacho


Cardiovascular exam: PRESENT: +S1, +S2


GI/Abdominal exam: PRESENT: soft


Neurological exam: PRESENT: alert





Results


Laboratory Results: 


                                        





                                 07/13/20 13:45 





                                 07/13/20 13:45 





                                        





07/13/20 17:16   Toe - Amputation Site   Gram Stain - Final


07/13/20 17:16   Toe - Amputation Site   Wound Culture - Final


                            Enterobacter Cloacae


                            Enterococcus Faecalis(Group D)


                            Bacteroides Fragilis Group


                            Anaerococcus (Peptostrep) Sp.








Impressions: 


                                        





Chest X-Ray  07/13/20 15:07


IMPRESSION:  NO ACUTE RADIOGRAPHIC FINDING IN THE CHEST.


 








Extremity Arterial Study  07/13/20 20:00


IMPRESSION:  Possible inflow stenosis.  Stenosis mid femoral artery not 

significantly changed.  Small vessel disease.


 














Assessment & Plan





- Diagnosis


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


Is this a current diagnosis for this admission?: Yes   


Plan: 


Continue antibiotic








(2) Peripheral vascular disease


Is this a current diagnosis for this admission?: Yes   





(3) End stage renal disease on dialysis


Is this a current diagnosis for this admission?: Yes   


Plan: 


Continue PD








(4) Cellulitis of left foot


Is this a current diagnosis for this admission?: Yes   





- Time


Time Spent with patient: 25-34 minutes


Level of Care: [facility]


Medications reviewed and adjusted accordingly: Yes

## 2020-07-17 LAB
ANION GAP SERPL CALC-SCNC: 9 MMOL/L (ref 5–19)
BUN SERPL-MCNC: 52 MG/DL (ref 7–20)
CALCIUM: 9.1 MG/DL (ref 8.4–10.2)
CHLORIDE SERPL-SCNC: 97 MMOL/L (ref 98–107)
CO2 SERPL-SCNC: 29 MMOL/L (ref 22–30)
ERYTHROCYTE [DISTWIDTH] IN BLOOD BY AUTOMATED COUNT: 15.5 % (ref 11.5–14)
GLUCOSE SERPL-MCNC: 145 MG/DL (ref 75–110)
HCT VFR BLD CALC: 26.3 % (ref 37.9–51)
HGB BLD-MCNC: 9.1 G/DL (ref 13.5–17)
MCH RBC QN AUTO: 34 PG (ref 27–33.4)
MCHC RBC AUTO-ENTMCNC: 34.6 G/DL (ref 32–36)
MCV RBC AUTO: 99 FL (ref 80–97)
PLATELET # BLD: 229 10^3/UL (ref 150–450)
POTASSIUM SERPL-SCNC: 3.6 MMOL/L (ref 3.6–5)
RBC # BLD AUTO: 2.67 10^6/UL (ref 4.35–5.55)
WBC # BLD AUTO: 6.3 10^3/UL (ref 4–10.5)

## 2020-07-17 RX ADMIN — CALCITRIOL SCH MCG: 0.25 CAPSULE, LIQUID FILLED ORAL at 10:34

## 2020-07-17 RX ADMIN — PIPERACILLIN AND TAZOBACTAM SCH MLS/HR: 2; .25 INJECTION, POWDER, LYOPHILIZED, FOR SOLUTION INTRAVENOUS; PARENTERAL at 21:39

## 2020-07-17 RX ADMIN — INSULIN LISPRO SCH: 100 INJECTION, SOLUTION INTRAVENOUS; SUBCUTANEOUS at 12:02

## 2020-07-17 RX ADMIN — CALCIUM ACETATE SCH MG: 667 CAPSULE ORAL at 15:53

## 2020-07-17 RX ADMIN — INSULIN LISPRO SCH UNIT: 100 INJECTION, SOLUTION INTRAVENOUS; SUBCUTANEOUS at 16:20

## 2020-07-17 RX ADMIN — POTASSIUM CHLORIDE SCH MEQ: 750 TABLET, FILM COATED, EXTENDED RELEASE ORAL at 10:32

## 2020-07-17 RX ADMIN — CALCIUM ACETATE SCH MG: 667 CAPSULE ORAL at 10:32

## 2020-07-17 RX ADMIN — PANTOPRAZOLE SODIUM SCH MG: 40 TABLET, DELAYED RELEASE ORAL at 10:32

## 2020-07-17 RX ADMIN — HYDRALAZINE HYDROCHLORIDE SCH MG: 50 TABLET, FILM COATED ORAL at 21:40

## 2020-07-17 RX ADMIN — INSULIN LISPRO SCH UNIT: 100 INJECTION, SOLUTION INTRAVENOUS; SUBCUTANEOUS at 22:20

## 2020-07-17 RX ADMIN — FUROSEMIDE SCH MG: 80 TABLET ORAL at 10:30

## 2020-07-17 RX ADMIN — CALCIUM ACETATE SCH: 667 CAPSULE ORAL at 10:30

## 2020-07-17 RX ADMIN — FUROSEMIDE SCH MG: 80 TABLET ORAL at 18:38

## 2020-07-17 RX ADMIN — HYDRALAZINE HYDROCHLORIDE SCH MG: 50 TABLET, FILM COATED ORAL at 06:31

## 2020-07-17 RX ADMIN — PIPERACILLIN AND TAZOBACTAM SCH MLS/HR: 2; .25 INJECTION, POWDER, LYOPHILIZED, FOR SOLUTION INTRAVENOUS; PARENTERAL at 10:34

## 2020-07-17 RX ADMIN — HYDRALAZINE HYDROCHLORIDE SCH MG: 50 TABLET, FILM COATED ORAL at 14:22

## 2020-07-17 RX ADMIN — INSULIN LISPRO SCH: 100 INJECTION, SOLUTION INTRAVENOUS; SUBCUTANEOUS at 10:33

## 2020-07-17 NOTE — PDOC DISCHARGE SUMMARY
Impression





- Admit/DC Date/PCP


Admission Date/Primary Care Provider: 


  07/13/20 15:15





  CATHI FARMER MD





Discharge Date: 07/18/20





- Discharge Diagnosis


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


Is this a current diagnosis for this admission?: Yes   





(2) Peripheral vascular disease


Is this a current diagnosis for this admission?: Yes   





(3) End stage renal disease on dialysis


Is this a current diagnosis for this admission?: Yes   





(4) Cellulitis of left foot


Is this a current diagnosis for this admission?: Yes   





- Additional Information


Discharge Diet: Other (Comments)


Discharge Activity: Activity As Tolerated, Balance Activity w/Rest, No tub bath


Referrals: 


CATHI FARMER MD [Primary Care Provider] - 07/23/20 11:00 am


Prescriptions: 


Levofloxacin [Levaquin 500 mg Tablet] 500 mg PO DAILY #5 tablet


Home Medications: 








Furosemide [Lasix 80 mg Tablet] 80 mg PO Q12 03/25/20 


Hydralazine HCl 100 mg PO Q8 03/25/20 


Calcitriol [Rocaltrol 0.5 mcg Capsule] 0.5 mcg PO DAILY 07/13/20 


Calcium Acetate [Phoslo 667 Mg Capsule] 1,334 mg PO AC 07/13/20 


Calcium Acetate [Phoslo 667 mg Capsule] 667 mg PO .WITH SNACKS 07/13/20 


Pantoprazole Sodium [Protonix 40 mg Dr Tablet] 40 mg PO QAM 07/13/20 


Potassium Chloride [Klor-Con 10 Meq Tablet ER] 10 meq PO DAILY 07/13/20 


Levofloxacin [Levaquin 500 mg Tablet] 500 mg PO DAILY #5 tablet 07/17/20 











History of Present Illiness


History of Present Illness: 


CHOCO MONTESINOS JR is a 55 year old male,He has a history of end-stage renal 

disease on peritoneal dialysis, type 2 diabetes mellitus He came to the office 

for evaluation of discoloration of the left fourth and fifth toe he said he 

noticed discoloration of his toe over the weekend on evaluation in the office it

was obviously a wet gangrene of the left fifth toe and the fourth toe with 

erythema of the dorsum of the left foot consistent with cellulitis patient was 

immediately admitted into the hospital consultation was obtained from the 

surgeon for infection source control he underwent open amputation of the left 

fifth and fourth toes including portions of the metatarsal bones.  He has 

underlining peripheral vascular disease.








Hospital Course


Hospital Course: 


Patient was admitted for the management of cellulitis of the left foot with wet 

gangrene of the fourth and fifth digit of the left foot with a background of 

peripheral vascular disease, he has a history of end-stage renal disease on 

peritoneal dialysis.  He was seen in consultation by the surgeon he underwent 

amputation of the fourth and fifth digit of the left foot, he was also seen by 

nephrology for his peritoneal dialysis.  He received IV antibiotic to cover 

potential pathogens, he has polymicrobial infection of the foot.  The plan is 

for patient to follow with vascular surgery on discharge, he will need further 

evaluation of the PAD, will follow with me in the office on Monday and I will 

make arrangements for follow-up with vascular surgery.





Physical Exam


Vital Signs: 


                                        











Temp Pulse Resp BP Pulse Ox


 


 98.4 F   108 H  18   105/64   100 


 


 07/17/20 20:00  07/17/20 20:00  07/17/20 20:00  07/17/20 20:00  07/17/20 20:00








                                 Intake & Output











 07/16/20 07/17/20 07/18/20





 06:59 06:59 06:59


 


Intake Total 8896 8700 4310


 


Output Total 4900 96383 2000


 


Balance 3996 -1300 2310


 


Weight 67.5 kg 71.2 kg 71.2 kg











General appearance: PRESENT: no acute distress


Eye exam: PRESENT: PERRLA


Respiratory exam: PRESENT: clear to auscultation nacho


Cardiovascular exam: PRESENT: +S1, +S2


GI/Abdominal exam: PRESENT: soft


Neurological exam: PRESENT: alert, CN II-XII grossly intact





Results


Laboratory Results: 


                                        











WBC  6.3 10^3/uL (4.0-10.5)   07/17/20  05:43    


 


RBC  2.67 10^6/uL (4.35-5.55)  L  07/17/20  05:43    


 


Hgb  9.1 g/dL (13.5-17.0)  L  07/17/20  05:43    


 


Hct  26.3 % (37.9-51.0)  L  07/17/20  05:43    


 


MCV  99 fl (80-97)  H  07/17/20  05:43    


 


MCH  34.0 pg (27.0-33.4)  H  07/17/20  05:43    


 


MCHC  34.6 g/dL (32.0-36.0)   07/17/20  05:43    


 


RDW  15.5 % (11.5-14.0)  H  07/17/20  05:43    


 


Plt Count  229 10^3/uL (150-450)   07/17/20  05:43    


 


Lymph % (Auto)  6.4 % (13-45)  L  07/13/20  13:45    


 


Mono % (Auto)  5.9 % (3-13)   07/13/20  13:45    


 


Eos % (Auto)  0.3 % (0-6)   07/13/20  13:45    


 


Baso % (Auto)  0.5 % (0-2)   07/13/20  13:45    


 


Absolute Neuts (auto)  9.3 10^3/uL (1.7-8.2)  H  07/13/20  13:45    


 


Absolute Lymphs (auto)  0.7 10^3/uL (0.5-4.7)   07/13/20  13:45    


 


Absolute Monos (auto)  0.6 10^3/uL (0.1-1.4)   07/13/20  13:45    


 


Absolute Eos (auto)  0.0 10^3/uL (0.0-0.6)   07/13/20  13:45    


 


Absolute Basos (auto)  0.1 10^3/uL (0.0-0.2)   07/13/20  13:45    


 


Seg Neutrophils %  86.9 % (42-78)  H  07/13/20  13:45    


 


Sodium  135.4 mmol/L (137-145)  L  07/17/20  05:43    


 


Potassium  3.6 mmol/L (3.6-5.0)   07/17/20  05:43    


 


Chloride  97 mmol/L ()  L  07/17/20  05:43    


 


Carbon Dioxide  29 mmol/L (22-30)   07/17/20  05:43    


 


Anion Gap  9  (5-19)   07/17/20  05:43    


 


BUN  52 mg/dL (7-20)  H  07/17/20  05:43    


 


Creatinine  13.07 mg/dL (0.52-1.25)  H  07/17/20  05:43    


 


Est GFR ( Amer)  5  (>60)  L  07/17/20  05:43    


 


Est GFR (MDRD) Non-Af  4  (>60)  L  07/17/20  05:43    


 


Glucose  145 mg/dL ()  H  07/17/20  05:43    


 


POC Glucose  167 mg/dL ()  H  07/17/20  16:15    


 


Calcium  9.1 mg/dL (8.4-10.2)   07/17/20  05:43    


 


Total Bilirubin  0.5 mg/dL (0.2-1.3)   07/13/20  13:45    


 


Direct Bilirubin  0.2 mg/dL (0.0-0.4)   07/13/20  13:45    


 


Neonat Total Bilirubin  Not Reportable   07/13/20  13:45    


 


Neonat Direct Bilirubin  Not Reportable   07/13/20  13:45    


 


Neonat Indirect Bili  Not Reportable   07/13/20  13:45    


 


AST  21 U/L (17-59)   07/13/20  13:45    


 


ALT  12 U/L (<50)   07/13/20  13:45    


 


Alkaline Phosphatase  73 U/L ()   07/13/20  13:45    


 


Total Protein  7.1 g/dL (6.3-8.2)   07/13/20  13:45    


 


Albumin  4.0 g/dL (3.5-5.0)   07/13/20  13:45    


 


SARS-CoV-2 (PCR)  NEGATIVE  (NEGATIVE)   07/13/20  14:56    











Impressions: 


                                        





Chest X-Ray  07/13/20 15:07


IMPRESSION:  NO ACUTE RADIOGRAPHIC FINDING IN THE CHEST.


 








Extremity Arterial Study  07/13/20 20:00


IMPRESSION:  Possible inflow stenosis.  Stenosis mid femoral artery not 

significantly changed.  Small vessel disease.


 














Stroke


Is this a Stroke Patient?: No





Acute Heart Failure





- **


Is this a Heart Failure Patient?: No

## 2020-07-18 VITALS — SYSTOLIC BLOOD PRESSURE: 136 MMHG | DIASTOLIC BLOOD PRESSURE: 61 MMHG

## 2020-07-18 RX ADMIN — HYDRALAZINE HYDROCHLORIDE SCH MG: 50 TABLET, FILM COATED ORAL at 06:26
